# Patient Record
Sex: MALE | Race: BLACK OR AFRICAN AMERICAN | Employment: FULL TIME | URBAN - METROPOLITAN AREA
[De-identification: names, ages, dates, MRNs, and addresses within clinical notes are randomized per-mention and may not be internally consistent; named-entity substitution may affect disease eponyms.]

---

## 2018-08-29 ENCOUNTER — HOSPITAL ENCOUNTER (OUTPATIENT)
Dept: PHYSICAL THERAPY | Age: 58
Discharge: HOME | End: 2018-08-29
Payer: COMMERCIAL

## 2018-08-29 PROCEDURE — 97110 THERAPEUTIC EXERCISES: CPT

## 2018-08-29 PROCEDURE — 97161 PT EVAL LOW COMPLEX 20 MIN: CPT

## 2018-08-29 NOTE — PROGRESS NOTES
Ambulatory/Rehab Services H2 Model Falls Risk Assessment    Risk Factor Pts. ·   Confusion/Disorientation/Impulsivity  []    4 ·   Symptomatic Depression  []   2 ·   Altered Elimination  []   1 ·   Dizziness/Vertigo  []   1 ·   Gender (Male)  [x]   1 ·   Any administered antiepileptics (anticonvulsants):  []   2 ·   Any administered benzodiazepines:  []   1 ·   Visual Impairment (specify):  []   1 ·   Portable Oxygen Use  []   1 ·   Orthostatic ? BP  []   1 ·   History of Recent Falls (within 3 mos.)  []   5     Ability to Rise from Chair (choose one) Pts. ·   Ability to rise in a single movement  []   0 ·   Pushes up, successful in one attempt  []   1 ·   Multiple attempts, but successful  []   3 ·   Unable to rise without assistance  []   4   Total: (5 or greater = High Risk) 1     Falls Prevention Plan:   []                Physical Limitations to Exercise (specify):   []                Mobility Assistance Device (type):   []                Exercise/Equipment Adaptation (specify):    ©2010 [facility] [address] [place] [address] Patent #[id number].  Federal Law prohibits the replication, distribution or use without written permission from [facility] [employer]

## 2018-08-29 NOTE — THERAPY EVALUATION
Ni Boogie  : 1960  Payor: Brayan العراقي / Plan: [facility] / Product Type: PPO /  [facility] at [facility]  [address], [address], [address]  [phone]   [phone]        OUTPATIENT PHYSICAL THERAPY:Initial Assessment 2018   ICD-10: Treatment Diagnosis: Pain in right leg (M79.604), Pain in right hip (M25.551)  Precautions/Allergies:   Review of patient's allergies indicates no known allergies. Fall Risk Score: 1 (? 5 = High Risk)  MD Orders: evaluate and treat MEDICAL/REFERRING DIAGNOSIS:  Strain of adductor muscle, fascia and tendon of right thigh, initial encounter [P80.312K]   DATE OF ONSET: 1 month ago  REFERRING PHYSICIAN: Yanira Galvan MD  RETURN PHYSICIAN APPOINTMENT: --     INITIAL ASSESSMENT:  Mr. Bhavana Knapp presents with pain in musculature of R hip, specifically the iliopsoas and adductor john, and decreased flexibility and strength in the R hip. Pt will benefit from skilled physical therapy to address dysfunctions and pain. PROBLEM LIST (Impacting functional limitations):  1. Decreased Strength  2. Decreased ADL/Functional Activities  3. Increased Pain  4. Decreased [place] with Home Exercise Program INTERVENTIONS PLANNED:  1. Home Exercise Program (HEP)  2. Manual Therapy including joint and soft tissue manipulation and mobilization, and dry needling  3. Therapeutic Exercise/Strengthening  4. Modalities including heat/cold application and electrical stimulation   TREATMENT PLAN:  Effective Dates: 18 TO 10/29/18. Frequency/Duration: 1-2 times a week for 4 weeks  GOALS: (Goals have been discussed and agreed upon with patient.)  Short-Term Functional Goals: Time Frame: 4 weeks  1. Pt will be independent with > or = 2 exercises in HEP. 2. Pt will report > or = 31 on LEFS. Discharge Goals: Time Frame: 8 weeks  1. Pt will be independent with > or = 4 exercises in HEP.    2. Pt will report > or = 49 on LEFS. 3. Pt will demonstrate functional R hip strength and mobility. Rehabilitation Potential For Stated Goals: Excellent  Regarding [address] [place] [address], I certify that the treatment plan above will be carried out by a therapist or under their direction. Thank you for this referral,  Serenity Denton, PT, DPT                   The information in this section was collected on 8/29/18 (except where otherwise noted). HISTORY:   History of Present Injury/Illness (Reason for Referral):  Has a one year history of groin/leg/hip pain that started to get a lot worse about 1 month ago. Went to  3 weeks ago. Had an inguinal hernia sx in 2004, the doctor said the hernia repair looks good and this is a separate issue. Makes pain worse: lifting, twisting, pushing as pedal on forklift. Makes better: ice  Past Medical History/Comorbidities:   Mr. Payton Dandy  has a past medical history of H/O appendicitis; H/O hernia repair; History of cellulitis; Hyperlipidemia; Prediabetes; and Visit for dental examination (03/22/2017). He also has no past medical history of Aneurysm ([address] Arrhythmia; Arthritis; Asthma; Autoimmune disease ([address] CAD (coronary artery disease); Cancer ([address] Chronic kidney disease; Chronic pain; Coagulation defects; COPD; Diabetes ([address] GERD (gastroesophageal reflux disease); Heart failure ([address] Hypertension; Liver disease; Other ill-defined conditions(799.89); Psychiatric disorder; PUD (peptic ulcer disease); Seizures ([address] Stroke [facility]); Thromboembolus ([address] or Thyroid disease. Mr. Payton Dandy  has a past surgical history that includes hx appendectomy (1984); hx hernia repair (2004); and hx colonoscopy.   Social History/Living Environment:     house  Prior Level of Function/Work/Activity:  Working at [employer], lifting boxes  Dominant Side:         RIGHT  Current Medications:       Current Outpatient Prescriptions:     naproxen (NAPROSYN) 500 mg tablet, TAKE 1 TABLET BY MOUTH TWICE A DAY WITH MEALS, Disp: 60 Tab, Rfl: 1    traMADol (ULTRAM) 50 mg tablet, Take 1 Tab by mouth every six (6) hours as needed for Pain. Max Daily Amount: 200 mg., Disp: 30 Tab, Rfl: 0    ezetimibe (ZETIA) 10 mg tablet, Take 1 Tab by mouth daily. , Disp: 90 Tab, Rfl: 3    simvastatin (ZOCOR) 40 mg tablet, TAKE 1 TAB BY MOUTH DAILY. , Disp: 90 Tab, Rfl: 2    multivitamin (ONE A DAY) tablet, Take 1 Tab by mouth daily. , Disp: , Rfl:    Date Last Reviewed:  8/29/2018   Number of Personal Factors/Comorbidities that affect the Plan of Care: 0: LOW COMPLEXITY   EXAMINATION:   Observation/Orthostatic Postural Assessment:          Standing: no abnormalities noted        Ambulation: no abnormalities noted    Palpation:          Increased tone and tenderness to palpation R iliacus mm belly, iliopsoas         Mild tenderness in adductor john  ROM/Strength  Joint/Movement  Range of Motion- eval ROM - reassess  Date:  Strength- eval Strength - reassess  Date:   Hip flexion R WFL  3+/5    Hip extension R stretch felt going past neutral  4/5    Hip abduction   4/5              Balance:          No functional deficits noted  SFMA Top Tier (FN = Functional and Non-painful, FP = Functional and Painful, DP = Dysfunctional and Painful, DN = Dysfunctional and Non-painful)  Cervical flexion: fn  Cervical extension: fn   Cervical rotation: L fn, R fn  Upper Extremity Pattern 1 (extension, IR): L dn, R dn  Upper Extremity Pattern 2 (flexion, ER): L dn, R dn  Multi-segmental flexion: fn  Multi-segmental extension: dn  Multi-segmental rotation: L dn, R dn  Single-Leg Stance: L dn, R dn  Overhead Deep Squat: --   Summary: no pain with screen, but limited into extension and rotations     Body Structures Involved:  1. Joints  2. Muscles Body Functions Affected:  1. Sensory/Pain  2. Neuromusculoskeletal  3. Movement Related Activities and Participation Affected:  1. General Tasks and Demands  2. Self Care  3. Domestic Life  4.  Community, Social and [place]   Number of elements (examined above) that affect the Plan of Care: 1-2: LOW COMPLEXITY   CLINICAL PRESENTATION:   Presentation: Stable and uncomplicated: LOW COMPLEXITY   CLINICAL DECISION MAKING:   Outcome Measure: Tool Used: Lower Extremity Functional Scale (LEFS)  Score:  Initial: 22/80 Most Recent: X/80 (Date: -- )   Interpretation of Score: 20 questions each scored on a 5 point scale with 0 representing \"extreme difficulty or unable to perform\" and 4 representing \"no difficulty\". The lower the score, the greater the functional disability. 80/80 represents no disability. Minimal detectable change is 9 points. Score 80 79-63 62-48 47-32 31-16 15-1 0   Modifier CH CI CJ CK CL CM CN     Medical Necessity:   · Skilled intervention continues to be required due to decreased function. Reason for Services/Other Comments:  · Patient continues to require skilled intervention due to decreased function. Use of outcome tool(s) and clinical judgement create a POC that gives a: Clear prediction of patient's progress: LOW COMPLEXITY            TREATMENT:   (In addition to Assessment/Re-Assessment sessions the following treatments were rendered)  Pre-treatment Symptoms/Complaints:  Pt has high pain levels with movement  Pain: Initial:      Post Session:       THERAPEUTIC EXERCISE: (10 minutes):  Exercises per grid below to improve mobility and strength. Date:  8/29 Date:   Date:     Activity/Exercise Parameters Parameters Parameters         SLR X 10     Supine hip flexor stretch 30 sec hold x 3     1/2 kneeling hip flexor stretch 10 sec hold x 3                           MANUAL THERAPY: ( minutes): To increase motion and reduce pain    MODALITIES: ( minutes):       Treatment/Session Assessment:    · Response to Treatment:  Pt demonstrated understanding of exercises. Increased fatigue with SLR. · Compliance with Program/Exercises: Will assess as treatment progresses.   · Recommendations/Intent for next treatment session: \"Next visit will focus on advancements to more challenging activities\".   Total Treatment Duration:  PT Patient Time In/Time Out  Time In: 1615  Time Out: 1700    Future Appointments  Date Time Provider Jamey Gonzalez   9/5/2018 4:15 PM Lianna Rosa PT, DPT [facility]   9/7/2018 10:10 AM PST [facility]   9/10/2018 5:15 PM Geovani Laboy PT, DPT [facility]   9/19/2018 5:15 PM Geovani Laboy PT DPT [facility]   9/28/2018 8:40 AM Dustin Smith MD [facility]   9/28/2018 10:45 AM Lianna Rosa PT, DPT [facility]       Lianna Rosa PT, DPT

## 2018-09-05 ENCOUNTER — HOSPITAL ENCOUNTER (OUTPATIENT)
Dept: PHYSICAL THERAPY | Age: 58
Discharge: HOME | End: 2018-09-05
Payer: COMMERCIAL

## 2018-09-05 PROCEDURE — 97140 MANUAL THERAPY 1/> REGIONS: CPT

## 2018-09-05 PROCEDURE — 97110 THERAPEUTIC EXERCISES: CPT

## 2018-09-05 NOTE — THERAPY EVALUATION
Ni Boogie : 1960 Payor: Brayan العراقي / Plan: [facility] / Product Type: PPO /  [id number]  at [facility] [address], [address], [place] [phone]   [phone] OUTPATIENT PHYSICAL THERAPY:Initial Assessment 2018 ICD-10: Treatment Diagnosis: Pain in right leg (M79.604), Pain in right hip (M25.551) Precautions/Allergies:  
Review of patient's allergies indicates no known allergies. Fall Risk Score: 1 (? 5 = High Risk) MD Orders: evaluate and treat MEDICAL/REFERRING DIAGNOSIS: 
Strain of adductor muscle, fascia and tendon of right thigh, initial encounter [A88.720P] DATE OF ONSET: 1 month ago REFERRING PHYSICIAN: Yanira Galvan MD 
RETURN PHYSICIAN APPOINTMENT: --  
 
INITIAL ASSESSMENT:  Mr. Bhavana Knapp presents with pain in musculature of R hip, specifically the iliopsoas and adductor john, and decreased flexibility and strength in the R hip. Pt will benefit from skilled physical therapy to address dysfunctions and pain. PROBLEM LIST (Impacting functional limitations): 1. Decreased Strength 2. Decreased ADL/Functional Activities 3. Increased Pain 4. Decreased [place] with Home Exercise Program INTERVENTIONS PLANNED: 
1. Home Exercise Program (HEP) 2. Manual Therapy including joint and soft tissue manipulation and mobilization, and dry needling 3. Therapeutic Exercise/Strengthening 4. Modalities including heat/cold application and electrical stimulation TREATMENT PLAN: 
Effective Dates: 18 TO 10/29/18. Frequency/Duration: 1-2 times a week for 4 weeks GOALS: (Goals have been discussed and agreed upon with patient.) Short-Term Functional Goals: Time Frame: 4 weeks 1. Pt will be independent with > or = 2 exercises in HEP. 2. Pt will report > or = 31 on LEFS. Discharge Goals: Time Frame: 8 weeks 1. Pt will be independent with > or = 4 exercises in HEP. 2. Pt will report > or = 49 on LEFS. 3. Pt will demonstrate functional R hip strength and mobility. Rehabilitation Potential For Stated Goals: Excellent Regarding Mar Flor's therapy, I certify that the treatment plan above will be carried out by a therapist or under their direction. Thank you for this referral, Marin Sterling, PT, DPT The information in this section was collected on 8/29/18 (except where otherwise noted). HISTORY:  
History of Present Injury/Illness (Reason for Referral): 
Has a one year history of groin/leg/hip pain that started to get a lot worse about 1 month ago. Went to  3 weeks ago. Had an inguinal hernia sx in 2004, the doctor said the hernia repair looks good and this is a separate issue. Makes pain worse: lifting, twisting, pushing as pedal on forklift. Makes better: ice Past Medical History/Comorbidities:  
Mr. Nina Slater  has a past medical history of H/O appendicitis; H/O hernia repair; History of cellulitis; Hyperlipidemia; Prediabetes; and Visit for dental examination (03/22/2017). He also has no past medical history of Aneurysm ([address] Arrhythmia; Arthritis; Asthma; Autoimmune disease ([address] CAD (coronary artery disease); Cancer ([address] Chronic kidney disease; Chronic pain; Coagulation defects; COPD; Diabetes ([address] GERD (gastroesophageal reflux disease); Heart failure ([address] Hypertension; Liver disease; Other ill-defined conditions(799.89); Psychiatric disorder; PUD (peptic ulcer disease); Seizures ([address] Stroke [facility]); Thromboembolus ([address] or Thyroid disease. Mr. Nina Slater  has a past surgical history that includes hx appendectomy (1984); hx hernia repair (2004); and hx colonoscopy. Social History/Living Environment:  
  house Prior Level of Function/Work/Activity: 
Working at [employer] Dominant Side:  
      RIGHT Current Medications:   
  
Current Outpatient Prescriptions:   naproxen (NAPROSYN) 500 mg tablet, TAKE 1 TABLET BY MOUTH TWICE A DAY WITH MEALS, Disp: 60 Tab, Rfl: 1 
  traMADol (ULTRAM) 50 mg tablet, Take 1 Tab by mouth every six (6) hours as needed for Pain. Max Daily Amount: 200 mg., Disp: 30 Tab, Rfl: 0 
  ezetimibe (ZETIA) 10 mg tablet, Take 1 Tab by mouth daily. , Disp: 90 Tab, Rfl: 3 
  simvastatin (ZOCOR) 40 mg tablet, TAKE 1 TAB BY MOUTH DAILY. , Disp: 90 Tab, Rfl: 2 
  multivitamin (ONE A DAY) tablet, Take 1 Tab by mouth daily. , Disp: , Rfl:   
Date Last Reviewed:  9/5/2018 Number of Personal Factors/Comorbidities that affect the Plan of Care: 0: LOW COMPLEXITY EXAMINATION:  
Observation/Orthostatic Postural Assessment:   
      Standing: no abnormalities noted Ambulation: no abnormalities noted Palpation:   
      Increased tone and tenderness to palpation R iliacus mm belly, iliopsoas Mild tenderness in adductor john ROM/Strength Joint/Movement  Range of Motion- eval ROM - reassess Date:  Strength- eval Strength - reassess Date:  
Hip flexion R WFL  3+/5 Hip extension R stretch felt going past neutral  4/5 Hip abduction   4/5 Balance: No functional deficits noted SFMA Top Tier (FN = Functional and Non-painful, FP = Functional and Painful, DP = Dysfunctional and Painful, DN = Dysfunctional and Non-painful) Cervical flexion: fn 
Cervical extension: fn  
Cervical rotation: L fn, R fn 
Upper Extremity Pattern 1 (extension, IR): L dn, R dn Upper Extremity Pattern 2 (flexion, ER): L dn, R dn 
Multi-segmental flexion: fn 
Multi-segmental extension: dn 
Multi-segmental rotation: L dn, R dn 
Single-Leg Stance: L dn, R dn 
Overhead Deep Squat: --  
Summary: no pain with screen, but limited into extension and rotations Body Structures Involved: 1. Joints 2. Muscles Body Functions Affected: 1. Sensory/Pain 2. Neuromusculoskeletal 
3. Movement Related Activities and Participation Affected: 1. General Tasks and Demands 2. Self Care 3. Domestic Life 4. Community, Social and [place] Number of elements (examined above) that affect the Plan of Care: 1-2: LOW COMPLEXITY CLINICAL PRESENTATION:  
Presentation: Stable and uncomplicated: LOW COMPLEXITY CLINICAL DECISION MAKING:  
Outcome Measure: Tool Used: Lower Extremity Functional Scale (LEFS) Score:  Initial: 22/80 Most Recent: X/80 (Date: -- ) Interpretation of Score: 20 questions each scored on a 5 point scale with 0 representing \"extreme difficulty or unable to perform\" and 4 representing \"no difficulty\". The lower the score, the greater the functional disability. 80/80 represents no disability. Minimal detectable change is 9 points. Score 80 79-63 62-48 47-32 31-16 15-1 0 Modifier CH CI CJ CK CL CM CN Medical Necessity:  
· Skilled intervention continues to be required due to decreased function. Reason for Services/Other Comments: 
· Patient continues to require skilled intervention due to decreased function. Use of outcome tool(s) and clinical judgement create a POC that gives a: Clear prediction of patient's progress: LOW COMPLEXITY  
  
 
 
 
TREATMENT:  
(In addition to Assessment/Re-Assessment sessions the following treatments were rendered) Pre-treatment Symptoms/Complaints:  Pt reports significant reduction in pain since IE. [place] issue. Pain: Initial:  
   Post Session:    
 
THERAPEUTIC EXERCISE: (25 minutes):  Exercises per grid below to improve mobility and strength. Date: 
8/29 Date: 
9/5 Date: Activity/Exercise Parameters Parameters Parameters Recumbent stepper  X 8 min level 1 SLR X 10 X 12 Supine hip flexor stretch 30 sec hold x 3 30 sec hold x 3, with isometric hold against therapist for contract-relax 1/2 kneeling hip flexor stretch 10 sec hold x 3 Bridge with march  X 10 Abdominal isometrics  10 sec hold x 5 ea   
marching  4 laps x 40 ft   
 sidestepping  4 laps x 40 ft Step up  6\" step x 20 ea MANUAL THERAPY: (15 minutes): To increase motion and reduce pain 
- hip flexor release 
- cross friction massage to distal iliopsoas MODALITIES: ( minutes): Treatment/Session Assessment:   
· Response to Treatment:  Pt reports that he can 'feel it' with most exercises. Focused on hip flexor strengthening and stretching activities. · Compliance with Program/Exercises: Will assess as treatment progresses. · Recommendations/Intent for next treatment session: \"Next visit will focus on advancements to more challenging activities\". Total Treatment Duration: PT Patient Time In/Time Out Time In: 7828 Time Out: 3116 Future Appointments Date Time Provider Jamey Gonzalez 9/7/2018 10:10 AM PST LAB [facility]  
9/10/2018 5:15 PM Wilfrid Granados PT, DPT [facility]  
9/19/2018 5:15 PM Wilfrid Granados PT, DPT [facility]  
9/28/2018 8:40 AM Kelly Hardwick MD [facility] PST PST  
9/28/2018 10:45 AM Luz Blas PT, DPT [facility] Luz Blas PT, DPT

## 2018-09-05 NOTE — PROGRESS NOTES
Noam Branch  : 1960  Payor: Leora Arredondo / Plan: [facility] / Product Type: PPO /  [address]  at [address]  [address], [address], [place]  [phone]   [phone]        OUTPATIENT PHYSICAL THERAPY:Daily Note 2018   ICD-10: Treatment Diagnosis: Pain in right leg (M79.604), Pain in right hip (M25.551)  Precautions/Allergies:   Review of patient's allergies indicates no known allergies. Fall Risk Score: 1 (? 5 = High Risk)  MD Orders: evaluate and treat MEDICAL/REFERRING DIAGNOSIS:  Strain of adductor muscle, fascia and tendon of right thigh, initial encounter [K68.395X]   DATE OF ONSET: 1 month ago  REFERRING PHYSICIAN: Wilda Montoya MD  RETURN PHYSICIAN APPOINTMENT: --     INITIAL ASSESSMENT:  Mr. Joana Diaz presents with pain in musculature of R hip, specifically the iliopsoas and adductor john, and decreased flexibility and strength in the R hip. Pt will benefit from skilled physical therapy to address dysfunctions and pain. PROBLEM LIST (Impacting functional limitations):  1. Decreased Strength  2. Decreased ADL/Functional Activities  3. Increased Pain  4. Decreased [place] with Home Exercise Program INTERVENTIONS PLANNED:  1. Home Exercise Program (HEP)  2. Manual Therapy including joint and soft tissue manipulation and mobilization, and dry needling  3. Therapeutic Exercise/Strengthening  4. Modalities including heat/cold application and electrical stimulation   TREATMENT PLAN:  Effective Dates: 18 TO 10/29/18. Frequency/Duration: 1-2 times a week for 4 weeks  GOALS: (Goals have been discussed and agreed upon with patient.)  Short-Term Functional Goals: Time Frame: 4 weeks  1. Pt will be independent with > or = 2 exercises in HEP. 2. Pt will report > or = 31 on LEFS. Discharge Goals: Time Frame: 8 weeks  1. Pt will be independent with > or = 4 exercises in HEP.    2. Pt will report > or = 49 on LEFS.   3. Pt will demonstrate functional R hip strength and mobility. Rehabilitation Potential For Stated Goals: Excellent  Regarding [address] [place] [address], I certify that the treatment plan above will be carried out by a therapist or under their direction. Thank you for this referral,  Luz Blas, PT, DPT                   The information in this section was collected on 8/29/18 (except where otherwise noted). HISTORY:   History of Present Injury/Illness (Reason for Referral):  Has a one year history of groin/leg/hip pain that started to get a lot worse about 1 month ago. Went to  3 weeks ago. Had an inguinal hernia sx in 2004, the doctor said the hernia repair looks good and this is a separate issue. Makes pain worse: lifting, twisting, pushing as pedal on forklift. Makes better: ice  Past Medical History/Comorbidities:   Mr. Rosa Gruber  has a past medical history of H/O appendicitis; H/O hernia repair; History of cellulitis; Hyperlipidemia; Prediabetes; and Visit for dental examination (03/22/2017). He also has no past medical history of Aneurysm ([address] Arrhythmia; Arthritis; Asthma; Autoimmune disease ([address] CAD (coronary artery disease); Cancer ([address] Chronic kidney disease; Chronic pain; Coagulation defects; COPD; Diabetes ([address] GERD (gastroesophageal reflux disease); Heart failure ([address] Hypertension; Liver disease; Other ill-defined conditions(799.89); Psychiatric disorder; PUD (peptic ulcer disease); Seizures ([address] Stroke [facility]); Thromboembolus ([address] or Thyroid disease. Mr. Rosa Gruber  has a past surgical history that includes hx appendectomy (1984); hx hernia repair (2004); and hx colonoscopy.   Social History/Living Environment:     house  Prior Level of Function/Work/Activity:  Working at [employer], lifting boxes  Dominant Side:         RIGHT  Current Medications:       Current Outpatient Prescriptions:     naproxen (NAPROSYN) 500 mg tablet, TAKE 1 TABLET BY MOUTH TWICE A DAY WITH MEALS, Disp: 60 Tab, Rfl: 1    traMADol (ULTRAM) 50 mg tablet, Take 1 Tab by mouth every six (6) hours as needed for Pain. Max Daily Amount: 200 mg., Disp: 30 Tab, Rfl: 0    ezetimibe (ZETIA) 10 mg tablet, Take 1 Tab by mouth daily. , Disp: 90 Tab, Rfl: 3    simvastatin (ZOCOR) 40 mg tablet, TAKE 1 TAB BY MOUTH DAILY. , Disp: 90 Tab, Rfl: 2    multivitamin (ONE A DAY) tablet, Take 1 Tab by mouth daily. , Disp: , Rfl:    Date Last Reviewed:  9/5/2018   Number of Personal Factors/Comorbidities that affect the Plan of Care: 0: LOW COMPLEXITY   EXAMINATION:   Observation/Orthostatic Postural Assessment:          Standing: no abnormalities noted        Ambulation: no abnormalities noted    Palpation:          Increased tone and tenderness to palpation R iliacus mm belly, iliopsoas         Mild tenderness in adductor john  ROM/Strength  Joint/Movement  Range of Motion- eval ROM - reassess  Date:  Strength- eval Strength - reassess  Date:   Hip flexion R WFL  3+/5    Hip extension R stretch felt going past neutral  4/5    Hip abduction   4/5              Balance:          No functional deficits noted  SFMA Top Tier (FN = Functional and Non-painful, FP = Functional and Painful, DP = Dysfunctional and Painful, DN = Dysfunctional and Non-painful)  Cervical flexion: fn  Cervical extension: fn   Cervical rotation: L fn, R fn  Upper Extremity Pattern 1 (extension, IR): L dn, R dn  Upper Extremity Pattern 2 (flexion, ER): L dn, R dn  Multi-segmental flexion: fn  Multi-segmental extension: dn  Multi-segmental rotation: L dn, R dn  Single-Leg Stance: L dn, R dn  Overhead Deep Squat: --   Summary: no pain with screen, but limited into extension and rotations     Body Structures Involved:  1. Joints  2. Muscles Body Functions Affected:  1. Sensory/Pain  2. Neuromusculoskeletal  3. Movement Related Activities and Participation Affected:  1. General Tasks and Demands  2. Self Care  3. Domestic Life  4.  Community, Social and Civic Life   Number of elements (examined above) that affect the Plan of Care: 1-2: LOW COMPLEXITY   CLINICAL PRESENTATION:   Presentation: Stable and uncomplicated: LOW COMPLEXITY   CLINICAL DECISION MAKING:   Outcome Measure: Tool Used: Lower Extremity Functional Scale (LEFS)  Score:  Initial: 22/80 Most Recent: X/80 (Date: -- )   Interpretation of Score: 20 questions each scored on a 5 point scale with 0 representing \"extreme difficulty or unable to perform\" and 4 representing \"no difficulty\". The lower the score, the greater the functional disability. 80/80 represents no disability. Minimal detectable change is 9 points. Score 80 79-63 62-48 47-32 31-16 15-1 0   Modifier CH CI CJ CK CL CM CN     Medical Necessity:   · Skilled intervention continues to be required due to decreased function. Reason for Services/Other Comments:  · Patient continues to require skilled intervention due to decreased function. Use of outcome tool(s) and clinical judgement create a POC that gives a: Clear prediction of patient's progress: LOW COMPLEXITY            TREATMENT:   (In addition to Assessment/Re-Assessment sessions the following treatments were rendered)  Pre-treatment Symptoms/Complaints:  Pt reports significant reduction in pain since IE. [place] issue. Pain: Initial:      Post Session:       THERAPEUTIC EXERCISE: (25 minutes):  Exercises per grid below to improve mobility and strength.     Date:  8/29 Date:  9/5 Date:     Activity/Exercise Parameters Parameters Parameters   Recumbent stepper  X 8 min level 1    SLR X 10 X 12    Supine hip flexor stretch 30 sec hold x 3 30 sec hold x 3, with isometric hold against therapist for contract-relax    1/2 kneeling hip flexor stretch 10 sec hold x 3     Bridge with march  X 10    Abdominal isometrics  10 sec hold x 5 ea    marching  4 laps x 40 ft    sidestepping  4 laps x 40 ft    Step up  6\" step x 20 ea MANUAL THERAPY: (15 minutes): To increase motion and reduce pain  - hip flexor release  - cross friction massage to distal iliopsoas    MODALITIES: ( minutes):       Treatment/Session Assessment:    · Response to Treatment:  Pt reports that he can 'feel it' with most exercises. Focused on hip flexor strengthening and stretching activities. · Compliance with Program/Exercises: Will assess as treatment progresses. · Recommendations/Intent for next treatment session: \"Next visit will focus on advancements to more challenging activities\".   Total Treatment Duration:  PT Patient Time In/Time Out  Time In: 1615  Time Out: 1655    Future Appointments  Date Time Provider Jamey Gonzalez   9/7/2018 10:10 AM PST LAB [facility]   9/10/2018 5:15 PM Mason Santos PT, DPT [facility]   9/19/2018 5:15 PM Mason Santos PT, DPSHAHRIAR [facility]   9/28/2018 8:40 AM Joanne Torrez MD [facility] PST PST   9/28/2018 10:45 AM Manual Delta PT, DPT [facility]       Manual Delat PT, DPT

## 2018-09-10 ENCOUNTER — HOSPITAL ENCOUNTER (OUTPATIENT)
Dept: PHYSICAL THERAPY | Age: 58
Discharge: HOME | End: 2018-09-10
Payer: COMMERCIAL

## 2018-09-10 PROCEDURE — 97110 THERAPEUTIC EXERCISES: CPT

## 2018-09-10 PROCEDURE — 97140 MANUAL THERAPY 1/> REGIONS: CPT

## 2018-09-10 NOTE — PROGRESS NOTES
Kade Llanos  : 1960  Payor: Claudeen Lao / Plan: [facility] / Product Type: PPO /  [facility]rapy Center at [address]  [address], [address], [address]  [phone]   [phone]        OUTPATIENT PHYSICAL THERAPY:Daily Note 9/10/2018   ICD-10: Treatment Diagnosis: Pain in right leg (M79.604), Pain in right hip (M25.551)  Precautions/Allergies:   Review of patient's allergies indicates no known allergies. Fall Risk Score: 1 (? 5 = High Risk)  MD Orders: evaluate and treat MEDICAL/REFERRING DIAGNOSIS:  Strain of adductor muscle, fascia and tendon of right thigh, initial encounter [Q32.336V]   DATE OF ONSET: 1 month ago  REFERRING PHYSICIAN: Mihai Mayberry MD  RETURN PHYSICIAN APPOINTMENT: --     INITIAL ASSESSMENT:  Mr. Faith Beht presents with pain in musculature of R hip, specifically the iliopsoas and adductor john, and decreased flexibility and strength in the R hip. Pt will benefit from skilled physical therapy to address dysfunctions and pain. PROBLEM LIST (Impacting functional limitations):  1. Decreased Strength  2. Decreased ADL/Functional Activities  3. Increased Pain  4. Decreased [place] with Home Exercise Program INTERVENTIONS PLANNED:  1. Home Exercise Program (HEP)  2. Manual Therapy including joint and soft tissue manipulation and mobilization, and dry needling  3. Therapeutic Exercise/Strengthening  4. Modalities including heat/cold application and electrical stimulation   TREATMENT PLAN:  Effective Dates: 18 TO 10/29/18. Frequency/Duration: 1-2 times a week for 4 weeks  GOALS: (Goals have been discussed and agreed upon with patient.)  Short-Term Functional Goals: Time Frame: 4 weeks  1. Pt will be independent with > or = 2 exercises in HEP. 2. Pt will report > or = 31 on LEFS. Discharge Goals: Time Frame: 8 weeks  1. Pt will be independent with > or = 4 exercises in HEP.    2. Pt will report > or = 49 on LEFS. 3. Pt will demonstrate functional R hip strength and mobility. Rehabilitation Potential For Stated Goals: Excellent  Regarding [address] [place] [address], I certify that the treatment plan above will be carried out by a therapist or under their direction. Thank you for this referral,  Devin Young, PT, DPT                   The information in this section was collected on 8/29/18 (except where otherwise noted). HISTORY:   History of Present Injury/Illness (Reason for Referral):  Has a one year history of groin/leg/hip pain that started to get a lot worse about 1 month ago. Went to  3 weeks ago. Had an inguinal hernia sx in 2004, the doctor said the hernia repair looks good and this is a separate issue. Makes pain worse: lifting, twisting, pushing as pedal on forklift. Makes better: ice  Past Medical History/Comorbidities:   Mr. Faith Beth  has a past medical history of H/O appendicitis; H/O hernia repair; History of cellulitis; Hyperlipidemia; Prediabetes; and Visit for dental examination (03/22/2017). He also has no past medical history of Aneurysm ([address] Arrhythmia; Arthritis; Asthma; Autoimmune disease ([address] CAD (coronary artery disease); Cancer ([address] Chronic kidney disease; Chronic pain; Coagulation defects; COPD; Diabetes ([address] GERD (gastroesophageal reflux disease); Heart failure ([address] Hypertension; Liver disease; Other ill-defined conditions(799.89); Psychiatric disorder; PUD (peptic ulcer disease); Seizures ([address] Stroke [facility]); Thromboembolus ([address] or Thyroid disease. Mr. Faith Beth  has a past surgical history that includes hx appendectomy (1984); hx hernia repair (2004); and hx colonoscopy.   Social History/Living Environment:     house  Prior Level of Function/Work/Activity:  Working at [employer], lifting boxes  Dominant Side:         RIGHT  Current Medications:       Current Outpatient Prescriptions:     naproxen (NAPROSYN) 500 mg tablet, TAKE 1 TABLET BY MOUTH TWICE A DAY WITH MEALS, Disp: 60 Tab, Rfl: 1    traMADol (ULTRAM) 50 mg tablet, Take 1 Tab by mouth every six (6) hours as needed for Pain. Max Daily Amount: 200 mg., Disp: 30 Tab, Rfl: 0    ezetimibe (ZETIA) 10 mg tablet, Take 1 Tab by mouth daily. , Disp: 90 Tab, Rfl: 3    simvastatin (ZOCOR) 40 mg tablet, TAKE 1 TAB BY MOUTH DAILY. , Disp: 90 Tab, Rfl: 2    multivitamin (ONE A DAY) tablet, Take 1 Tab by mouth daily. , Disp: , Rfl:    Date Last Reviewed:  9/10/2018   Number of Personal Factors/Comorbidities that affect the Plan of Care: 0: LOW COMPLEXITY   EXAMINATION:   Observation/Orthostatic Postural Assessment:          Standing: no abnormalities noted        Ambulation: no abnormalities noted    Palpation:          Increased tone and tenderness to palpation R iliacus mm belly, iliopsoas         Mild tenderness in adductor john  ROM/Strength  Joint/Movement  Range of Motion- eval ROM - reassess  Date:  Strength- eval Strength - reassess  Date:   Hip flexion R WFL  3+/5    Hip extension R stretch felt going past neutral  4/5    Hip abduction   4/5              Balance:          No functional deficits noted  SFMA Top Tier (FN = Functional and Non-painful, FP = Functional and Painful, DP = Dysfunctional and Painful, DN = Dysfunctional and Non-painful)  Cervical flexion: fn  Cervical extension: fn   Cervical rotation: L fn, R fn  Upper Extremity Pattern 1 (extension, IR): L dn, R dn  Upper Extremity Pattern 2 (flexion, ER): L dn, R dn  Multi-segmental flexion: fn  Multi-segmental extension: dn  Multi-segmental rotation: L dn, R dn  Single-Leg Stance: L dn, R dn  Overhead Deep Squat: --   Summary: no pain with screen, but limited into extension and rotations     Body Structures Involved:  1. Joints  2. Muscles Body Functions Affected:  1. Sensory/Pain  2. Neuromusculoskeletal  3. Movement Related Activities and Participation Affected:  1. General Tasks and Demands  2. Self Care  3. Domestic Life  4.  Community, Social and [place]   Number of elements (examined above) that affect the Plan of Care: 1-2: LOW COMPLEXITY   CLINICAL PRESENTATION:   Presentation: Stable and uncomplicated: LOW COMPLEXITY   CLINICAL DECISION MAKING:   Outcome Measure: Tool Used: Lower Extremity Functional Scale (LEFS)  Score:  Initial: 22/80 Most Recent: X/80 (Date: -- )   Interpretation of Score: 20 questions each scored on a 5 point scale with 0 representing \"extreme difficulty or unable to perform\" and 4 representing \"no difficulty\". The lower the score, the greater the functional disability. 80/80 represents no disability. Minimal detectable change is 9 points. Score 80 79-63 62-48 47-32 31-16 15-1 0   Modifier CH CI CJ CK CL CM CN     Medical Necessity:   · Skilled intervention continues to be required due to decreased function. Reason for Services/Other Comments:  · Patient continues to require skilled intervention due to decreased function. Use of outcome tool(s) and clinical judgement create a POC that gives a: Clear prediction of patient's progress: LOW COMPLEXITY            TREATMENT:   (In addition to Assessment/Re-Assessment sessions the following treatments were rendered)  Pre-treatment Symptoms/Complaints:  Pt reports continuing improvement, down from the 9/10 pain before therapy started. Pain: Initial: 5/10     Post Session:       THERAPEUTIC EXERCISE: (30 minutes):  Exercises per grid below to improve mobility and strength.     Date:  8/29 Date:  9/5 Date:  9/10   Activity/Exercise Parameters Parameters Parameters   Recumbent stepper  X 8 min level 1    Recumbent bike   X 8 min level 3   SLR X 10 X 12 X 15   Supine hip flexor stretch 30 sec hold x 3 30 sec hold x 3, with isometric hold against therapist for contract-relax 30 sec hold x 3, with isometric hold against therapist for contract-relax   1/2 kneeling hip flexor stretch 10 sec hold x 3     Bridge with march  X 10 2 x 10   Abdominal isometrics  10 sec hold x 5 ea 10 sec hold x 5 ea   marching  4 laps x 40 ft 4 laps x 40 ft   sidestepping  4 laps x 40 ft 4 laps x 40 ft   Step up  6\" step x 20 ea 6\" step x 20 ea forwards and lateral                         MANUAL THERAPY: (15 minutes): To increase motion and reduce pain  - cross friction massage to distal iliopsoas    MODALITIES: ( minutes):       Treatment/Session Assessment:    · Response to Treatment:  Pt reports minimal increased pain with exercises, but able to tolerate more repetitions. · Compliance with Program/Exercises: Will assess as treatment progresses. · Recommendations/Intent for next treatment session: \"Next visit will focus on advancements to more challenging activities\".   Total Treatment Duration:  PT Patient Time In/Time Out  Time In: 3509  Time Out: 1700    Future Appointments  Date Time Provider Jamey Gonzalez   9/19/2018 5:15 PM Tianna Avilez, PT, DPT [facility]   9/28/2018 10:10 AM PST LAB [facility] PST PST   9/28/2018 10:45 AM Angela Coello, PT, DPT [facility]       Angela Coello, PT, DPT

## 2018-09-19 ENCOUNTER — HOSPITAL ENCOUNTER (OUTPATIENT)
Dept: PHYSICAL THERAPY | Age: 58
Discharge: HOME | End: 2018-09-19
Payer: COMMERCIAL

## 2018-09-19 PROCEDURE — 97140 MANUAL THERAPY 1/> REGIONS: CPT

## 2018-09-19 PROCEDURE — 97110 THERAPEUTIC EXERCISES: CPT

## 2018-09-19 NOTE — PROGRESS NOTES
Carol Mcnair  : 1960  Payor: Carrie Deluca / Plan: [place] [facility] / Product Type: PPO /  [facility] at [facility]  [address], [address], [address]  [phone]   [phone]        OUTPATIENT PHYSICAL THERAPY:Daily Note 2018   ICD-10: Treatment Diagnosis: Pain in right leg (M79.604), Pain in right hip (M25.551)  Precautions/Allergies:   Review of patient's allergies indicates no known allergies. Fall Risk Score: 1 (? 5 = High Risk)  MD Orders: evaluate and treat MEDICAL/REFERRING DIAGNOSIS:  Strain of adductor muscle, fascia and tendon of right thigh, initial encounter [C72.181Z]   DATE OF ONSET: 1 month ago  REFERRING PHYSICIAN: Elizabeth Glez MD  RETURN PHYSICIAN APPOINTMENT: --     INITIAL ASSESSMENT:  Mr. Sergio Olivares presents with pain in musculature of R hip, specifically the iliopsoas and adductor john, and decreased flexibility and strength in the R hip. Pt will benefit from skilled physical therapy to address dysfunctions and pain. PROBLEM LIST (Impacting functional limitations):  1. Decreased Strength  2. Decreased ADL/Functional Activities  3. Increased Pain  4. Decreased [place] with Home Exercise Program INTERVENTIONS PLANNED:  1. Home Exercise Program (HEP)  2. Manual Therapy including joint and soft tissue manipulation and mobilization, and dry needling  3. Therapeutic Exercise/Strengthening  4. Modalities including heat/cold application and electrical stimulation   TREATMENT PLAN:  Effective Dates: 18 TO 10/29/18. Frequency/Duration: 1-2 times a week for 4 weeks  GOALS: (Goals have been discussed and agreed upon with patient.)  Short-Term Functional Goals: Time Frame: 4 weeks  1. Pt will be independent with > or = 2 exercises in HEP. 2. Pt will report > or = 31 on LEFS. Discharge Goals: Time Frame: 8 weeks  1. Pt will be independent with > or = 4 exercises in HEP.    2. Pt will report > or = 49 on LEFS. 3. Pt will demonstrate functional R hip strength and mobility. Rehabilitation Potential For Stated Goals: Excellent  Regarding [address] [place] [address], I certify that the treatment plan above will be carried out by a therapist or under their direction. Thank you for this referral,  Ruben Ansari, PT, DPT                   The information in this section was collected on 8/29/18 (except where otherwise noted). HISTORY:   History of Present Injury/Illness (Reason for Referral):  Has a one year history of groin/leg/hip pain that started to get a lot worse about 1 month ago. Went to  3 weeks ago. Had an inguinal hernia sx in 2004, the doctor said the hernia repair looks good and this is a separate issue. Makes pain worse: lifting, twisting, pushing as pedal on forklift. Makes better: ice  Past Medical History/Comorbidities:   Mr. Ana Sultana  has a past medical history of H/O appendicitis; H/O hernia repair; History of cellulitis; Hyperlipidemia; Prediabetes; and Visit for dental examination (03/22/2017). He also has no past medical history of Aneurysm ([address] Arrhythmia; Arthritis; Asthma; Autoimmune disease ([address] CAD (coronary artery disease); Cancer ([address] Chronic kidney disease; Chronic pain; Coagulation defects; COPD; Diabetes ([address] GERD (gastroesophageal reflux disease); Heart failure ([address] Hypertension; Liver disease; Other ill-defined conditions(799.89); Psychiatric disorder; PUD (peptic ulcer disease); Seizures ([address] Stroke [facility]); Thromboembolus ([address] or Thyroid disease. Mr. Ana Sultana  has a past surgical history that includes hx appendectomy (1984); hx hernia repair (2004); and hx colonoscopy.   Social History/Living Environment:     house  Prior Level of Function/Work/Activity:  Working at [employer], lifting boxes  Dominant Side:         RIGHT  Current Medications:       Current Outpatient Prescriptions:     naproxen (NAPROSYN) 500 mg tablet, TAKE 1 TABLET BY MOUTH TWICE A DAY WITH MEALS, Disp: 60 Tab, Rfl: 1    traMADol (ULTRAM) 50 mg tablet, Take 1 Tab by mouth every six (6) hours as needed for Pain. Max Daily Amount: 200 mg., Disp: 30 Tab, Rfl: 0    ezetimibe (ZETIA) 10 mg tablet, Take 1 Tab by mouth daily. , Disp: 90 Tab, Rfl: 3    simvastatin (ZOCOR) 40 mg tablet, TAKE 1 TAB BY MOUTH DAILY. , Disp: 90 Tab, Rfl: 2    multivitamin (ONE A DAY) tablet, Take 1 Tab by mouth daily. , Disp: , Rfl:    Date Last Reviewed:  9/19/2018   Number of Personal Factors/Comorbidities that affect the Plan of Care: 0: LOW COMPLEXITY   EXAMINATION:   Observation/Orthostatic Postural Assessment:          Standing: no abnormalities noted        Ambulation: no abnormalities noted    Palpation:          Increased tone and tenderness to palpation R iliacus mm belly, iliopsoas         Mild tenderness in adductor john  ROM/Strength  Joint/Movement  Range of Motion- eval ROM - reassess  Date:  Strength- eval Strength - reassess  Date:   Hip flexion R WFL  3+/5    Hip extension R stretch felt going past neutral  4/5    Hip abduction   4/5              Balance:          No functional deficits noted  SFMA Top Tier (FN = Functional and Non-painful, FP = Functional and Painful, DP = Dysfunctional and Painful, DN = Dysfunctional and Non-painful)  Cervical flexion: fn  Cervical extension: fn   Cervical rotation: L fn, R fn  Upper Extremity Pattern 1 (extension, IR): L dn, R dn  Upper Extremity Pattern 2 (flexion, ER): L dn, R dn  Multi-segmental flexion: fn  Multi-segmental extension: dn  Multi-segmental rotation: L dn, R dn  Single-Leg Stance: L dn, R dn  Overhead Deep Squat: --   Summary: no pain with screen, but limited into extension and rotations     Body Structures Involved:  1. Joints  2. Muscles Body Functions Affected:  1. Sensory/Pain  2. Neuromusculoskeletal  3. Movement Related Activities and Participation Affected:  1. General Tasks and Demands  2. Self Care  3. Domestic Life  4.  Community, Social and [place]   Number of elements (examined above) that affect the Plan of Care: 1-2: LOW COMPLEXITY   CLINICAL PRESENTATION:   Presentation: Stable and uncomplicated: LOW COMPLEXITY   CLINICAL DECISION MAKING:   Outcome Measure: Tool Used: Lower Extremity Functional Scale (LEFS)  Score:  Initial: 22/80 Most Recent: X/80 (Date: -- )   Interpretation of Score: 20 questions each scored on a 5 point scale with 0 representing \"extreme difficulty or unable to perform\" and 4 representing \"no difficulty\". The lower the score, the greater the functional disability. 80/80 represents no disability. Minimal detectable change is 9 points. Score 80 79-63 62-48 47-32 31-16 15-1 0   Modifier CH CI CJ CK CL CM CN     Medical Necessity:   · Skilled intervention continues to be required due to decreased function. Reason for Services/Other Comments:  · Patient continues to require skilled intervention due to decreased function. Use of outcome tool(s) and clinical judgement create a POC that gives a: Clear prediction of patient's progress: LOW COMPLEXITY            TREATMENT:   (In addition to Assessment/Re-Assessment sessions the following treatments were rendered)  Pre-treatment Symptoms/Complaints:  Pt reports continuing improvement, walking up to 5 miles and cycling without significant symptoms. Pain: Initial: 5/10     Post Session:       THERAPEUTIC EXERCISE: (30 minutes):  Exercises per grid below to improve mobility and strength.     Date:  8/29 Date:  9/5 Date:  9/19   Activity/Exercise Parameters Parameters Parameters   Recumbent stepper  X 8 min level 1    Recumbent bike   X 8 min level 3   SLR X 10 X 12 X 15   Supine hip flexor stretch 30 sec hold x 3 30 sec hold x 3, with isometric hold against therapist for contract-relax 30 sec hold x 3, with isometric hold against therapist for contract-relax   1/2 kneeling hip flexor stretch 10 sec hold x 3     Bridge with march  X 10 2 x 10   Abdominal isometrics  10 sec hold x 5 ea 10 sec hold x 5 ea   marching  4 laps x 40 ft 4 laps x 40 ft   sidestepping  4 laps x 40 ft 4 laps x 40 ft   Step up  6\" step x 20 ea 6\" step x 20 ea forwards and lateral                         MANUAL THERAPY: (15 minutes): To increase motion and reduce pain  - cross friction massage to distal iliopsoas    MODALITIES: ( minutes):       Treatment/Session Assessment:    · Response to Treatment:  Pt reports minimal increased pain with exercises  · Compliance with Program/Exercises: Will assess as treatment progresses. · Recommendations/Intent for next treatment session: \"Next visit will focus on advancements to more challenging activities\".   Total Treatment Duration:  PT Patient Time In/Time Out  Time In: 1630  Time Out: 1715    Future Appointments  Date Time Provider Jamey Gonzalez   9/19/2018 5:15 PM Abena Styles, PT, DPT [facility]   9/28/2018 10:10 AM PST LAB [facility] PST PST   9/28/2018 10:45 AM [facility]       Abena Styles, PT, DPT

## 2018-09-28 ENCOUNTER — HOSPITAL ENCOUNTER (OUTPATIENT)
Dept: PHYSICAL THERAPY | Age: 58
Discharge: HOME | End: 2018-09-28
Payer: COMMERCIAL

## 2018-09-28 PROCEDURE — 97110 THERAPEUTIC EXERCISES: CPT

## 2018-09-28 NOTE — PROGRESS NOTES
Jenni Myers  : 1960  Payor: Leanne Mejias / Plan: [place] [facility] / Product Type: PPO /  [facility]rapy Center at [facility]  [address], [address], [address]  [phone]   [phone]        OUTPATIENT PHYSICAL THERAPY:Daily Note and Discharge 2018   ICD-10: Treatment Diagnosis: Pain in right leg (M79.604), Pain in right hip (M25.551)  Precautions/Allergies:   Review of patient's allergies indicates no known allergies. Fall Risk Score: 1 (? 5 = High Risk)  MD Orders: evaluate and treat MEDICAL/REFERRING DIAGNOSIS:  Strain of adductor muscle, fascia and tendon of right thigh, initial encounter [V96.297W]   DATE OF ONSET: 1 month ago  REFERRING PHYSICIAN: Rosalind Oliver MD  RETURN PHYSICIAN APPOINTMENT: --     INITIAL ASSESSMENT:  Mr. Collin Singh has attended 5 physical therapy treatments for R hip pain. He reports significant reduction in pain and improvement in function, he has returned to distance walking and bicycling without incident. He is independent with a home exercise program for hip stretching and strengthening. No further physical therapy is needed at this time. TREATMENT PLAN:  Discharge. GOALS: (Goals have been discussed and agreed upon with patient.)  Short-Term Functional Goals: Time Frame: 4 weeks  1. Pt will be independent with > or = 2 exercises in HEP. met  2. Pt will report > or = 31 on LEFS. met  Discharge Goals: Time Frame: 8 weeks  1. Pt will be independent with > or = 4 exercises in HEP. met  2. Pt will report > or = 49 on LEFS. met  3. Pt will demonstrate functional R hip strength and mobility. met    Thank you for this referral,  Breonna Baxter, PT, DPT                   The information in this section was collected on 18 (except where otherwise noted).   HISTORY:   History of Present Injury/Illness (Reason for Referral):  Has a one year history of groin/leg/hip pain that started to get a lot worse about 1 month ago. Went to  3 weeks ago. Had an inguinal hernia sx in 2004, the doctor said the hernia repair looks good and this is a separate issue. Makes pain worse: lifting, twisting, pushing as pedal on forklift. Makes better: ice  Past Medical History/Comorbidities:   Mr. Rachel Jacobs  has a past medical history of H/O appendicitis; H/O hernia repair; History of cellulitis; Hyperlipidemia; Prediabetes; and Visit for dental examination (03/22/2017). He also has no past medical history of [address]neurysm [address] Arrhythmia; Arthritis; Asthma; Autoimmune disease ([address] CAD (coronary artery disease); Cancer ([address] Chronic kidney disease; Chronic pain; Coagulation defects; COPD; Diabetes ([address] GERD (gastroesophageal reflux disease); Heart failure ([facility] Hypertension; Liver disease; Other ill-defined conditions(799.89); Psychiatric disorder; PUD (peptic ulcer disease); Seizures ([facility] Stroke [facility]); Thromboembolus ([facility] or Thyroid disease. Mr. Rachel Jacobs  has a past surgical history that includes hx appendectomy (1984); hx hernia repair (2004); and hx colonoscopy. Social History/Living Environment:     house  Prior Level of Function/Work/Activity:  Working at [employer], lifting boxes  Dominant Side:         RIGHT  Current Medications:       Current Outpatient Prescriptions:     naproxen (NAPROSYN) 500 mg tablet, TAKE 1 TABLET BY MOUTH TWICE A DAY WITH MEALS, Disp: 60 Tab, Rfl: 1    traMADol (ULTRAM) 50 mg tablet, Take 1 Tab by mouth every six (6) hours as needed for Pain. Max Daily Amount: 200 mg., Disp: 30 Tab, Rfl: 0    ezetimibe (ZETIA) 10 mg tablet, Take 1 Tab by mouth daily. , Disp: 90 Tab, Rfl: 3    simvastatin (ZOCOR) 40 mg tablet, TAKE 1 TAB BY MOUTH DAILY. , Disp: 90 Tab, Rfl: 2    multivitamin (ONE A DAY) tablet, Take 1 Tab by mouth daily. , Disp: , Rfl:    Date Last Reviewed:  9/28/2018   Number of Personal Factors/Comorbidities that affect the Plan of Care: 0: LOW COMPLEXITY   EXAMINATION: Observation/Orthostatic Postural Assessment:          Standing: no abnormalities noted        Ambulation: no abnormalities noted    Palpation:          Increased tone and tenderness to palpation R iliacus mm belly, iliopsoas         Mild tenderness in adductor john  ROM/Strength  Joint/Movement  Range of Motion- eval ROM - reassess  Date: 9/28  Strength- eval Strength - reassess  Date:9/28   Hip flexion R WFL R WFL 3+/5 4+/5   Hip extension R stretch felt going past neutral  4/5 4+/5   Hip abduction   4/5 4+/5             Balance:          No functional deficits noted  SFMA Top Tier (FN = Functional and Non-painful, FP = Functional and Painful, DP = Dysfunctional and Painful, DN = Dysfunctional and Non-painful)  Cervical flexion: fn  Cervical extension: fn   Cervical rotation: L fn, R fn  Upper Extremity Pattern 1 (extension, IR): L dn, R dn  Upper Extremity Pattern 2 (flexion, ER): L dn, R dn  Multi-segmental flexion: fn  Multi-segmental extension: dn  Multi-segmental rotation: L dn, R dn  Single-Leg Stance: L dn, R dn  Overhead Deep Squat: --   Summary: no pain with screen, but limited into extension and rotations     Body Structures Involved:  1. Joints  2. Muscles Body Functions Affected:  1. Sensory/Pain  2. Neuromusculoskeletal  3. Movement Related Activities and Participation Affected:  1. General Tasks and Demands  2. Self Care  3. Domestic Life  4. Community, Social and Civic Life   CLINICAL PRESENTATION:   CLINICAL DECISION MAKING:   Outcome Measure: Tool Used: Lower Extremity Functional Scale (LEFS)  Score:  Initial: 22/80 Most Recent: 79/80 (Date: 9/28/18 )   Interpretation of Score: 20 questions each scored on a 5 point scale with 0 representing \"extreme difficulty or unable to perform\" and 4 representing \"no difficulty\". The lower the score, the greater the functional disability. 80/80 represents no disability. Minimal detectable change is 9 points.   Score 80 79-63 62-48 47-32 31-16 15-1 0 Modifier CH CI CJ CK CL CM CN     Medical Necessity:   · Skilled intervention continues to be required due to decreased function. Reason for Services/Other Comments:  · Patient continues to require skilled intervention due to decreased function. TREATMENT:   (In addition to Assessment/Re-Assessment sessions the following treatments were rendered)  Pre-treatment Symptoms/Complaints:  Pt reports continuing improvement. No pain coming into therapy today. Feels that he has the tools he needs to continue his rehab by himself. Pain: Initial: 0/10     Post Session:       THERAPEUTIC EXERCISE: (45 minutes):  Exercises per grid below to improve mobility and strength. Date:  8/29 Date:  9/5 Date:  9/19 9/28   Activity/Exercise Parameters Parameters Parameters    Recumbent stepper  X 8 min level 1     Recumbent bike   X 8 min level 3 X 10 min level 3   SLR X 10 X 12 X 15 X 15   Supine hip flexor stretch 30 sec hold x 3 30 sec hold x 3, with isometric hold against therapist for contract-relax 30 sec hold x 3, with isometric hold against therapist for contract-relax 30 sec hold x 3   1/2 kneeling hip flexor stretch 10 sec hold x 3   10 sec hold x 3   Bridge with march  X 10 2 x 10 X 20   Abdominal isometrics  10 sec hold x 5 ea 10 sec hold x 5 ea 10 sec hold x 5 ea   marching  4 laps x 40 ft 4 laps x 40 ft 4 laps x 40 ft   sidestepping  4 laps x 40 ft 4 laps x 40 ft 4 laps x 40 ft   Lunge walk    4 laps x 40 ft   Step up  6\" step x 20 ea 6\" step x 20 ea forwards and lateral 6\" step x 20 ea forwards and lateral                            MANUAL THERAPY: ( minutes): To increase motion and reduce pain      MODALITIES: ( minutes):       Treatment/Session Assessment:    · Response to Treatment:  Pt reports minimal increased pain with exercises  · Compliance with Program/Exercises: Will assess as treatment progresses. · Recommendations/Intent for next treatment session:  \"Next visit will focus on advancements to more challenging activities\".   Total Treatment Duration:  PT Patient Time In/Time Out  Time In: 1045  Time Out: 1130    Future Appointments  Date Time Provider Jamey Gonzalez   10/19/2018 11:10 AM Levi Cowart MD [facility] PST PST       Zenia Luu, PT, DPT

## 2018-10-23 PROBLEM — E11.9 TYPE 2 DIABETES MELLITUS WITHOUT COMPLICATION, WITHOUT LONG-TERM CURRENT USE OF INSULIN (HCC): Status: ACTIVE | Noted: 2018-10-23

## 2019-06-13 ENCOUNTER — ANESTHESIA EVENT (OUTPATIENT)
Dept: SURGERY | Age: 59
End: 2019-06-13
Payer: OTHER MISCELLANEOUS

## 2019-06-14 ENCOUNTER — HOSPITAL ENCOUNTER (OUTPATIENT)
Age: 59
Setting detail: OUTPATIENT SURGERY
Discharge: HOME | End: 2019-06-14
Attending: ORTHOPAEDIC SURGERY | Admitting: ORTHOPAEDIC SURGERY
Payer: OTHER MISCELLANEOUS

## 2019-06-14 ENCOUNTER — ANESTHESIA (OUTPATIENT)
Dept: SURGERY | Age: 59
End: 2019-06-14
Payer: OTHER MISCELLANEOUS

## 2019-06-14 VITALS
HEART RATE: 72 BPM | BODY MASS INDEX: 31.93 KG/M2 | RESPIRATION RATE: 18 BRPM | SYSTOLIC BLOOD PRESSURE: 111 MMHG | WEIGHT: 210 LBS | OXYGEN SATURATION: 97 % | DIASTOLIC BLOOD PRESSURE: 64 MMHG | TEMPERATURE: 97.5 F

## 2019-06-14 DIAGNOSIS — L76.82 PAIN AT SURGICAL INCISION: Primary | ICD-10-CM

## 2019-06-14 LAB — GLUCOSE BLD STRIP.AUTO-MCNC: 116 MG/DL (ref 65–100)

## 2019-06-14 PROCEDURE — 74011250636 HC RX REV CODE- 250/636

## 2019-06-14 PROCEDURE — 74011250636 HC RX REV CODE- 250/636: Performed by: ANESTHESIOLOGY

## 2019-06-14 PROCEDURE — 77030018836 HC SOL IRR NACL ICUM -A: Performed by: ORTHOPAEDIC SURGERY

## 2019-06-14 PROCEDURE — 77030033005 HC TBNG ARTHSC PMP STRY -B: Performed by: ORTHOPAEDIC SURGERY

## 2019-06-14 PROCEDURE — 76060000032 HC ANESTHESIA 0.5 TO 1 HR: Performed by: ORTHOPAEDIC SURGERY

## 2019-06-14 PROCEDURE — 77030027384 HC PRB ARTHSCP SERFAS STRY -C: Performed by: ORTHOPAEDIC SURGERY

## 2019-06-14 PROCEDURE — 82962 GLUCOSE BLOOD TEST: CPT

## 2019-06-14 PROCEDURE — 77030010509 HC AIRWY LMA MSK TELE -A: Performed by: ANESTHESIOLOGY

## 2019-06-14 PROCEDURE — A4565 SLINGS: HCPCS | Performed by: ORTHOPAEDIC SURGERY

## 2019-06-14 PROCEDURE — 77030035253 HC BIT DRL ICONIX DISP STRY -B: Performed by: ORTHOPAEDIC SURGERY

## 2019-06-14 PROCEDURE — 77030006868 HC BLD SHV AUG STRY -B: Performed by: ORTHOPAEDIC SURGERY

## 2019-06-14 PROCEDURE — 77030003666 HC NDL SPINAL BD -A: Performed by: ORTHOPAEDIC SURGERY

## 2019-06-14 PROCEDURE — 74011000250 HC RX REV CODE- 250

## 2019-06-14 PROCEDURE — 76210000021 HC REC RM PH II 0.5 TO 1 HR: Performed by: ORTHOPAEDIC SURGERY

## 2019-06-14 PROCEDURE — 76010010054 HC POST OP PAIN BLOCK: Performed by: ORTHOPAEDIC SURGERY

## 2019-06-14 PROCEDURE — 76210000063 HC OR PH I REC FIRST 0.5 HR: Performed by: ORTHOPAEDIC SURGERY

## 2019-06-14 PROCEDURE — 77030006891 HC BLD SHV RESECT STRY -B: Performed by: ORTHOPAEDIC SURGERY

## 2019-06-14 PROCEDURE — 76010000160 HC OR TIME 0.5 TO 1 HR INTENSV-TIER 1: Performed by: ORTHOPAEDIC SURGERY

## 2019-06-14 PROCEDURE — 77030003602 HC NDL NRV BLK BBMI -B: Performed by: ANESTHESIOLOGY

## 2019-06-14 PROCEDURE — 77030002916 HC SUT ETHLN J&J -A: Performed by: ORTHOPAEDIC SURGERY

## 2019-06-14 PROCEDURE — 74011250636 HC RX REV CODE- 250/636: Performed by: ORTHOPAEDIC SURGERY

## 2019-06-14 PROCEDURE — 76942 ECHO GUIDE FOR BIOPSY: CPT | Performed by: ORTHOPAEDIC SURGERY

## 2019-06-14 RX ORDER — LIDOCAINE HYDROCHLORIDE 10 MG/ML
0.1 INJECTION INFILTRATION; PERINEURAL AS NEEDED
Status: DISCONTINUED | OUTPATIENT
Start: 2019-06-14 | End: 2019-06-14 | Stop reason: HOSPADM

## 2019-06-14 RX ORDER — ALBUTEROL SULFATE 0.83 MG/ML
2.5 SOLUTION RESPIRATORY (INHALATION) AS NEEDED
Status: DISCONTINUED | OUTPATIENT
Start: 2019-06-14 | End: 2019-06-14 | Stop reason: HOSPADM

## 2019-06-14 RX ORDER — LIDOCAINE HYDROCHLORIDE 20 MG/ML
INJECTION, SOLUTION EPIDURAL; INFILTRATION; INTRACAUDAL; PERINEURAL AS NEEDED
Status: DISCONTINUED | OUTPATIENT
Start: 2019-06-14 | End: 2019-06-14 | Stop reason: HOSPADM

## 2019-06-14 RX ORDER — MIDAZOLAM HYDROCHLORIDE 1 MG/ML
2 INJECTION, SOLUTION INTRAMUSCULAR; INTRAVENOUS ONCE
Status: COMPLETED | OUTPATIENT
Start: 2019-06-14 | End: 2019-06-14

## 2019-06-14 RX ORDER — OXYCODONE AND ACETAMINOPHEN 5; 325 MG/1; MG/1
1 TABLET ORAL
Qty: 24 TAB | Refills: 0 | Status: SHIPPED | OUTPATIENT
Start: 2019-06-14 | End: 2019-06-17

## 2019-06-14 RX ORDER — OXYCODONE HYDROCHLORIDE 5 MG/1
5 TABLET ORAL
Status: DISCONTINUED | OUTPATIENT
Start: 2019-06-14 | End: 2019-06-14 | Stop reason: HOSPADM

## 2019-06-14 RX ORDER — ONDANSETRON 2 MG/ML
INJECTION INTRAMUSCULAR; INTRAVENOUS AS NEEDED
Status: DISCONTINUED | OUTPATIENT
Start: 2019-06-14 | End: 2019-06-14 | Stop reason: HOSPADM

## 2019-06-14 RX ORDER — CEFAZOLIN SODIUM/WATER 2 G/20 ML
2 SYRINGE (ML) INTRAVENOUS ONCE
Status: COMPLETED | OUTPATIENT
Start: 2019-06-14 | End: 2019-06-14

## 2019-06-14 RX ORDER — MIDAZOLAM HYDROCHLORIDE 1 MG/ML
2 INJECTION, SOLUTION INTRAMUSCULAR; INTRAVENOUS
Status: DISCONTINUED | OUTPATIENT
Start: 2019-06-14 | End: 2019-06-14 | Stop reason: HOSPADM

## 2019-06-14 RX ORDER — OXYCODONE HYDROCHLORIDE 5 MG/1
10 TABLET ORAL
Status: DISCONTINUED | OUTPATIENT
Start: 2019-06-14 | End: 2019-06-14 | Stop reason: HOSPADM

## 2019-06-14 RX ORDER — SODIUM CHLORIDE, SODIUM LACTATE, POTASSIUM CHLORIDE, CALCIUM CHLORIDE 600; 310; 30; 20 MG/100ML; MG/100ML; MG/100ML; MG/100ML
100 INJECTION, SOLUTION INTRAVENOUS CONTINUOUS
Status: DISCONTINUED | OUTPATIENT
Start: 2019-06-14 | End: 2019-06-14 | Stop reason: HOSPADM

## 2019-06-14 RX ORDER — PROPOFOL 10 MG/ML
INJECTION, EMULSION INTRAVENOUS AS NEEDED
Status: DISCONTINUED | OUTPATIENT
Start: 2019-06-14 | End: 2019-06-14 | Stop reason: HOSPADM

## 2019-06-14 RX ORDER — DIPHENHYDRAMINE HYDROCHLORIDE 50 MG/ML
12.5 INJECTION, SOLUTION INTRAMUSCULAR; INTRAVENOUS
Status: DISCONTINUED | OUTPATIENT
Start: 2019-06-14 | End: 2019-06-14 | Stop reason: HOSPADM

## 2019-06-14 RX ORDER — ONDANSETRON 2 MG/ML
4 INJECTION INTRAMUSCULAR; INTRAVENOUS ONCE
Status: DISCONTINUED | OUTPATIENT
Start: 2019-06-14 | End: 2019-06-14 | Stop reason: HOSPADM

## 2019-06-14 RX ORDER — BUPIVACAINE HYDROCHLORIDE AND EPINEPHRINE 2.5; 5 MG/ML; UG/ML
INJECTION, SOLUTION EPIDURAL; INFILTRATION; INTRACAUDAL; PERINEURAL
Status: COMPLETED | OUTPATIENT
Start: 2019-06-14 | End: 2019-06-14

## 2019-06-14 RX ORDER — NALOXONE HYDROCHLORIDE 0.4 MG/ML
0.1 INJECTION, SOLUTION INTRAMUSCULAR; INTRAVENOUS; SUBCUTANEOUS AS NEEDED
Status: DISCONTINUED | OUTPATIENT
Start: 2019-06-14 | End: 2019-06-14 | Stop reason: HOSPADM

## 2019-06-14 RX ORDER — FENTANYL CITRATE 50 UG/ML
100 INJECTION, SOLUTION INTRAMUSCULAR; INTRAVENOUS ONCE
Status: DISCONTINUED | OUTPATIENT
Start: 2019-06-14 | End: 2019-06-14 | Stop reason: HOSPADM

## 2019-06-14 RX ORDER — HYDROMORPHONE HYDROCHLORIDE 2 MG/ML
0.5 INJECTION, SOLUTION INTRAMUSCULAR; INTRAVENOUS; SUBCUTANEOUS
Status: DISCONTINUED | OUTPATIENT
Start: 2019-06-14 | End: 2019-06-14 | Stop reason: HOSPADM

## 2019-06-14 RX ADMIN — Medication 2 G: at 10:33

## 2019-06-14 RX ADMIN — ONDANSETRON 4 MG: 2 INJECTION INTRAMUSCULAR; INTRAVENOUS at 11:00

## 2019-06-14 RX ADMIN — LIDOCAINE HYDROCHLORIDE 100 MG: 20 INJECTION, SOLUTION EPIDURAL; INFILTRATION; INTRACAUDAL; PERINEURAL at 10:28

## 2019-06-14 RX ADMIN — SODIUM CHLORIDE, SODIUM LACTATE, POTASSIUM CHLORIDE, AND CALCIUM CHLORIDE 100 ML/HR: 600; 310; 30; 20 INJECTION, SOLUTION INTRAVENOUS at 08:30

## 2019-06-14 RX ADMIN — PROPOFOL 200 MG: 10 INJECTION, EMULSION INTRAVENOUS at 10:28

## 2019-06-14 RX ADMIN — MIDAZOLAM HYDROCHLORIDE 2 MG: 2 INJECTION, SOLUTION INTRAMUSCULAR; INTRAVENOUS at 08:57

## 2019-06-14 RX ADMIN — BUPIVACAINE HYDROCHLORIDE AND EPINEPHRINE 30 ML: 2.5; 5 INJECTION, SOLUTION EPIDURAL; INFILTRATION; INTRACAUDAL; PERINEURAL at 09:00

## 2019-06-14 NOTE — ANESTHESIA PREPROCEDURE EVALUATION
Relevant Problems   No relevant active problems       Anesthetic History               Review of Systems / Medical History  Patient summary reviewed, nursing notes reviewed and pertinent labs reviewed    Pulmonary                   Neuro/Psych              Cardiovascular                  Exercise tolerance: >4 METS     GI/Hepatic/Renal                Endo/Other             Other Findings              Physical Exam    Airway  Mallampati: III  TM Distance: 4 - 6 cm  Neck ROM: normal range of motion   Mouth opening: Normal     Cardiovascular  Regular rate and rhythm,  S1 and S2 normal,  no murmur, click, rub, or gallop             Dental  No notable dental hx       Pulmonary  Breath sounds clear to auscultation               Abdominal         Other Findings            Anesthetic Plan    ASA: 2  Anesthesia type: general      Post-op pain plan if not by surgeon: peripheral nerve block single    Induction: Intravenous  Anesthetic plan and risks discussed with: Patient and Spouse

## 2019-06-14 NOTE — ANESTHESIA POSTPROCEDURE EVALUATION
Procedure(s):  RIGHT SHOULDER ARTHROSCOPY SUBACROMIAL DECOMPRESSION / DISTAL CLAVICLE RESECTION /.    general    Anesthesia Post Evaluation      Multimodal analgesia: multimodal analgesia used between 6 hours prior to anesthesia start to PACU discharge  Patient location during evaluation: PACU  Patient participation: complete - patient participated  Level of consciousness: awake and awake and alert  Pain management: adequate  Airway patency: patent  Anesthetic complications: no  Cardiovascular status: acceptable  Respiratory status: acceptable  Hydration status: acceptable  Post anesthesia nausea and vomiting:  controlled      Vitals Value Taken Time   /68 6/14/2019 11:50 AM   Temp 36.4 °C (97.5 °F) 6/14/2019 11:21 AM   Pulse 76 6/14/2019 11:53 AM   Resp 45 6/14/2019 11:53 AM   SpO2 97 % 6/14/2019 11:53 AM   Vitals shown include unvalidated device data.

## 2019-06-14 NOTE — OP NOTES
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 The arthroscope was inserted through a posterior portal  and the interior of the joint was examined. The labrum was intact. The biceps tendon was not visualized. The articular surfaces were normal. There was some degeneration of the cuff but there was not a full tear. The scope was then removed from the shoulder joint and then placed into the subacromial space. The bursal tissue was removed and visualization was obtained. The anterior acromion was prominent. A subacromial decompression was performed through the posterior portal using a cutting block technique. This provided a good decompression of the subacromial space. The rotator cuff was examined and was normal.         The distal clavicle was very degenerative. A distal clavicle resection was done through the anterior portal. The arthroscopic les was used to remove approximately 1 centimeter of the bone. The stab wounds were then closed with simple nylon sutures. A sterile dressing was applied. A sling was placed as well. The patient was then taken to the recovery room in satisfactory condition.           Elpidio Mcgraw M.D.

## 2019-06-14 NOTE — DISCHARGE INSTRUCTIONS
Post-Operative Instructions   For   [facility]  Shoulder Arthroscopy  Phone:  [phone]    1. Apply ice to the shoulder as needed. 2.   Keep dressings in place for the first two days. After two days you  may remove the dressings. Keep the stab wounds clean. The small stitches will be removed when you come to the office. You can cover them with small band aids. 3. You may discontinue use of your sling and begin active range of motion of the shoulder as tolerated by pain, unless you are instructed otherwise. 4. You may shower after the dressing comes off. Cover the small wounds with  waterproof band aids. 5. Use  pain medication as instructed on the bottle. If you have pain medication from another physcian do not use it with this medication. 6. You may have some side effects from your pain medication. If you have nausea, try taking your medication with food. For itching, you may take over the counter Benadryl. 7. If you have a problem, please call [address] [place] [address] at [phone]    Carolina Hilliard M.D. [facility]     ·     DIET  · Clear liquids until no nausea or vomiting; then light diet for the first day. · Advance to regular diet on second day, unless your doctor orders otherwise. · If nausea and vomiting continues, call your doctor. · Avoid greasy and spicy food today to reduce nausea. PAIN  · Take pain medication as directed by your doctor. · Call your doctor if pain is NOT relieved by medication. · DO NOT take aspirin of blood thinners unless directed by your doctor. · Take pain pills with food to reduce nausea  · Pain pills may cause constipation.  May use stool softener      CALL YOUR DOCTOR IF   · Excessive bleeding that does not stop after holding pressure over the area  · Temperature of 101 degrees F or above  · Excessive redness, swelling or bruising, and/ or green or yellow, smelly discharge from incision    AFTER ANESTHESIA   · For the first 24 hours: DO NOT Drive, Drink alcoholic beverages, or Make important decisions. · Be aware of dizziness following anesthesia and while taking pain medication. APPOINTMENT DATE/ TIME: Keep scheduled appointment     YOUR DOCTOR'S PHONE NUMBER [phone]      DISCHARGE SUMMARY from Nurse    PATIENT INSTRUCTIONS:    After general anesthesia or intravenous sedation, for 24 hours or while taking prescription Narcotics:  · Limit your activities  · Do not drive and operate hazardous machinery  · Do not make important personal or business decisions  · Do  not drink alcoholic beverages  · If you have not urinated within 8 hours after discharge, please contact your surgeon on call. *  Please give a list of your current medications to your Primary Care Provider. *  Please update this list whenever your medications are discontinued, doses are      changed, or new medications (including over-the-counter products) are added. *  Please carry medication information at all times in case of emergency situations. These are general instructions for a healthy lifestyle:    No smoking/ No tobacco products/ Avoid exposure to second hand smoke    Surgeon General's Warning:  Quitting smoking now greatly reduces serious risk to your health. Obesity, smoking, and sedentary lifestyle greatly increases your risk for illness    A healthy diet, regular physical exercise & weight monitoring are important for maintaining a healthy lifestyle    You may be retaining fluid if you have a history of heart failure or if you experience any of the following symptoms:  Weight gain of 3 pounds or more overnight or 5 pounds in a week, increased swelling in our hands or feet or shortness of breath while lying flat in bed. Please call your doctor as soon as you notice any of these symptoms; do not wait until your next office visit.     Recognize signs and symptoms of STROKE:    F-face looks uneven    A-arms unable to move or move unevenly    S-speech slurred or non-existent    T-time-call 911 as soon as signs and symptoms begin-DO NOT go       Back to bed or wait to see if you get better-TIME IS BRAIN.

## 2019-06-14 NOTE — BRIEF OP NOTE
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

## 2019-06-14 NOTE — ANESTHESIA PROCEDURE NOTES
Peripheral Block    Start time: 6/14/2019 8:57 AM  End time: 6/14/2019 9:00 AM  Performed by: Katina Coronel MD  Authorized by: Katina Coronel MD       Pre-procedure: Indications: at surgeon's request and post-op pain management    Preanesthetic Checklist: patient identified, risks and benefits discussed, site marked, timeout performed, anesthesia consent given and patient being monitored    Timeout Time: 08:57          Block Type:   Block Type:   Interscalene  Laterality:  Right  Monitoring:  Standard ASA monitoring, continuous pulse ox, frequent vital sign checks, heart rate, oxygen and responsive to questions  Injection Technique:  Single shot  Procedures: ultrasound guided    Patient Position: supine  Prep: chlorhexidine    Location:  Interscalene  Needle Type:  Stimuplex  Needle Gauge:  21 G  Needle Localization:  Ultrasound guidance and anatomical landmarks    Assessment:  Number of attempts:  1  Injection Assessment:  Incremental injection every 5 mL, local visualized surrounding nerve on ultrasound, negative aspiration for blood, no paresthesia, no intravascular symptoms and ultrasound image on chart  Patient tolerance:  Patient tolerated the procedure well with no immediate complications

## 2019-06-14 NOTE — H&P
Outpatient Surgery History and Physical:  Kailyn Wright was seen and examined. CHIEF COMPLAINT:   Right shoulder. PE:     Visit Vitals  /89 (BP 1 Location: Left arm, BP Patient Position: At rest)   Pulse 66   Temp 97.9 °F (36.6 °C)   Resp 17   Wt 95.3 kg (210 lb)   SpO2 98%   BMI 31.93 kg/m²       Heart:   Regular rhythm      Lungs:  Are clear      Past Medical History:    Patient Active Problem List    Diagnosis    Type 2 diabetes mellitus without complication, without long-term current use of insulin ([address]    Prediabetes    Hypercholesterolemia       Surgical History:   Past Surgical History:   Procedure Laterality Date    HX APPENDECTOMY  [id number]    HX COLONOSCOPY      HX HERNIA REPAIR  2004    [address]       Social History: Patient  reports that he quit smoking about 6 years ago. He has a 3.75 pack-year smoking history. He has never used smokeless tobacco. He reports that he does not drink alcohol or use drugs. Family History:   Family History   Problem Relation Age of Onset   Verlinda Smoker Cancer Sister         breast    Cancer Mother     No Known Problems Father        Allergies: Reviewed per EMR  No Known Allergies    Medications:    No current facility-administered medications on file prior to encounter. Current Outpatient Medications on File Prior to Encounter   Medication Sig    empagliflozin (JARDIANCE) 25 mg tablet Take 1 Tab by mouth daily.  ezetimibe (ZETIA) 10 mg tablet TAKE 1 TAB BY MOUTH DAILY.  simvastatin (ZOCOR) 40 mg tablet TAKE 1 TAB BY MOUTH DAILY.  multivitamin (ONE A DAY) tablet Take 1 Tab by mouth daily.  naproxen (NAPROSYN) 500 mg tablet TAKE 1 TABLET BY MOUTH TWICE A DAY WITH MEALS    traMADol (ULTRAM) 50 mg tablet Take 1 Tab by mouth every six (6) hours as needed for Pain. Max Daily Amount: 200 mg. The surgery is planned for the right shoulder SAD DCR. The patient is here today for outpatient surgery.  I have examined the patient, no changes are noted in the patient's medical status. Necessity for the procedure/care is still present and the history and physical above is current. See the office notes for the full long term history of the problem. Please see the recent office notes for the musculoskeletal examination.     Signed By: Candance Jo, MD     June 14, 2019 8:39 AM

## 2020-10-08 ENCOUNTER — ANESTHESIA EVENT (OUTPATIENT)
Dept: ENDOSCOPY | Age: 60
End: 2020-10-08
Payer: COMMERCIAL

## 2020-10-08 RX ORDER — SODIUM CHLORIDE, SODIUM LACTATE, POTASSIUM CHLORIDE, CALCIUM CHLORIDE 600; 310; 30; 20 MG/100ML; MG/100ML; MG/100ML; MG/100ML
100 INJECTION, SOLUTION INTRAVENOUS CONTINUOUS
Status: CANCELLED | OUTPATIENT
Start: 2020-10-08

## 2020-10-08 NOTE — PROGRESS NOTES
Confirmed appointment with patient via phone. Patient understands arrival time and procedure time. Ride will be present. No other concerns noted at this time.

## 2020-10-09 ENCOUNTER — HOSPITAL ENCOUNTER (OUTPATIENT)
Age: 60
Setting detail: OUTPATIENT SURGERY
Discharge: HOME | End: 2020-10-09
Attending: SURGERY | Admitting: SURGERY
Payer: COMMERCIAL

## 2020-10-09 ENCOUNTER — ANESTHESIA (OUTPATIENT)
Dept: ENDOSCOPY | Age: 60
End: 2020-10-09
Payer: COMMERCIAL

## 2020-10-09 VITALS
BODY MASS INDEX: 31.98 KG/M2 | HEIGHT: 68 IN | SYSTOLIC BLOOD PRESSURE: 127 MMHG | RESPIRATION RATE: 15 BRPM | HEART RATE: 67 BPM | TEMPERATURE: 98.6 F | DIASTOLIC BLOOD PRESSURE: 80 MMHG | OXYGEN SATURATION: 100 % | WEIGHT: 211 LBS

## 2020-10-09 DIAGNOSIS — K64.8 INTERNAL HEMORRHOIDS WITHOUT COMPLICATION: ICD-10-CM

## 2020-10-09 DIAGNOSIS — Z12.11 COLON CANCER SCREENING: ICD-10-CM

## 2020-10-09 LAB — GLUCOSE BLD STRIP.AUTO-MCNC: 149 MG/DL (ref 65–100)

## 2020-10-09 PROCEDURE — 45378 DIAGNOSTIC COLONOSCOPY: CPT | Performed by: SURGERY

## 2020-10-09 PROCEDURE — 2709999900 HC NON-CHARGEABLE SUPPLY: Performed by: SURGERY

## 2020-10-09 PROCEDURE — 76040000025: Performed by: SURGERY

## 2020-10-09 PROCEDURE — 74011250636 HC RX REV CODE- 250/636: Performed by: ANESTHESIOLOGY

## 2020-10-09 PROCEDURE — 74011250636 HC RX REV CODE- 250/636: Performed by: STUDENT IN AN ORGANIZED HEALTH CARE EDUCATION/TRAINING PROGRAM

## 2020-10-09 PROCEDURE — 74011000250 HC RX REV CODE- 250: Performed by: STUDENT IN AN ORGANIZED HEALTH CARE EDUCATION/TRAINING PROGRAM

## 2020-10-09 PROCEDURE — 76060000031 HC ANESTHESIA FIRST 0.5 HR: Performed by: SURGERY

## 2020-10-09 PROCEDURE — 82962 GLUCOSE BLOOD TEST: CPT

## 2020-10-09 RX ORDER — SODIUM CHLORIDE, SODIUM LACTATE, POTASSIUM CHLORIDE, CALCIUM CHLORIDE 600; 310; 30; 20 MG/100ML; MG/100ML; MG/100ML; MG/100ML
100 INJECTION, SOLUTION INTRAVENOUS CONTINUOUS
Status: DISCONTINUED | OUTPATIENT
Start: 2020-10-09 | End: 2020-10-09 | Stop reason: HOSPADM

## 2020-10-09 RX ORDER — SODIUM CHLORIDE, SODIUM LACTATE, POTASSIUM CHLORIDE, CALCIUM CHLORIDE 600; 310; 30; 20 MG/100ML; MG/100ML; MG/100ML; MG/100ML
INJECTION, SOLUTION INTRAVENOUS
Status: DISCONTINUED | OUTPATIENT
Start: 2020-10-09 | End: 2020-10-09 | Stop reason: HOSPADM

## 2020-10-09 RX ORDER — LIDOCAINE HYDROCHLORIDE 20 MG/ML
INJECTION, SOLUTION EPIDURAL; INFILTRATION; INTRACAUDAL; PERINEURAL AS NEEDED
Status: DISCONTINUED | OUTPATIENT
Start: 2020-10-09 | End: 2020-10-09 | Stop reason: HOSPADM

## 2020-10-09 RX ORDER — PROPOFOL 10 MG/ML
INJECTION, EMULSION INTRAVENOUS
Status: DISCONTINUED | OUTPATIENT
Start: 2020-10-09 | End: 2020-10-09 | Stop reason: HOSPADM

## 2020-10-09 RX ORDER — PROPOFOL 10 MG/ML
INJECTION, EMULSION INTRAVENOUS AS NEEDED
Status: DISCONTINUED | OUTPATIENT
Start: 2020-10-09 | End: 2020-10-09 | Stop reason: HOSPADM

## 2020-10-09 RX ADMIN — PROPOFOL 70 MG: 10 INJECTION, EMULSION INTRAVENOUS at 10:36

## 2020-10-09 RX ADMIN — PHENYLEPHRINE HYDROCHLORIDE 50 MCG: 10 INJECTION INTRAVENOUS at 10:53

## 2020-10-09 RX ADMIN — PHENYLEPHRINE HYDROCHLORIDE 50 MCG: 10 INJECTION INTRAVENOUS at 10:49

## 2020-10-09 RX ADMIN — LIDOCAINE HYDROCHLORIDE 50 MG: 20 INJECTION, SOLUTION EPIDURAL; INFILTRATION; INTRACAUDAL; PERINEURAL at 10:36

## 2020-10-09 RX ADMIN — PROPOFOL 160 MCG/KG/MIN: 10 INJECTION, EMULSION INTRAVENOUS at 10:36

## 2020-10-09 RX ADMIN — SODIUM CHLORIDE, SODIUM LACTATE, POTASSIUM CHLORIDE, AND CALCIUM CHLORIDE: 600; 310; 30; 20 INJECTION, SOLUTION INTRAVENOUS at 10:33

## 2020-10-09 RX ADMIN — SODIUM CHLORIDE, SODIUM LACTATE, POTASSIUM CHLORIDE, AND CALCIUM CHLORIDE 100 ML/HR: 600; 310; 30; 20 INJECTION, SOLUTION INTRAVENOUS at 09:56

## 2020-10-09 NOTE — ANESTHESIA PREPROCEDURE EVALUATION
Relevant Problems   No relevant active problems       Anesthetic History   No history of anesthetic complications            Review of Systems / Medical History  Patient summary reviewed and pertinent labs reviewed    Pulmonary          Smoker (Former smoker.  Quit about 8 years ago.)         Neuro/Psych   Within defined limits           Cardiovascular              Hyperlipidemia    Exercise tolerance: >4 METS  Comments: Denies recent CP, SOB or Palpitations   GI/Hepatic/Renal  Within defined limits              Endo/Other    Diabetes: well controlled, type 2         Other Findings              Physical Exam    Airway  Mallampati: III  TM Distance: 4 - 6 cm  Neck ROM: normal range of motion   Mouth opening: Normal     Cardiovascular  Regular rate and rhythm,  S1 and S2 normal,  no murmur, click, rub, or gallop             Dental  No notable dental hx       Pulmonary  Breath sounds clear to auscultation               Abdominal  GI exam deferred       Other Findings            Anesthetic Plan    ASA: 2  Anesthesia type: total IV anesthesia          Induction: Intravenous  Anesthetic plan and risks discussed with: Patient

## 2020-10-09 NOTE — DISCHARGE INSTRUCTIONS
Gastrointestinal Colonoscopy/Flexible Sigmoidoscopy - Lower Exam Discharge Instructions  1. Call Dr. Janell Brenner at [phone] for any problems or questions. 2. Contact the doctors office for follow up appointment as directed  3. Medication may cause drowsiness for several hours, therefore:  · Do not drive or operate machinery for reminder of the day. · No alcohol today. · Do not make any important or legal decisions for 24 hours. · Do not sign any legal documents for 24 hours. 4. Ordinarily, you may resume regular diet and activity after exam unless otherwise specified by your physician. 5. Because of air put into your colon during exam, you may experience some abdominal distension, relieved by the passage of gas, for several hours. 6. Contact your physician if you have any of the following:  · Excessive amount of bleeding - large amount when having a bowel movement. Occasional specks of blood with bowel movement would not be unusual.  · Severe abdominal pain  · Fever or Chills  Any additional instructions:  1. Repeat in 10 years       Instructions given to Demetra Bria and other family members.

## 2020-10-09 NOTE — H&P
History and Physical      Patient: Kanchan Gardner    Physician: Jerod Cuadra MD    Referring Physician: Joshua Lisa DO    Chief Complaint: For colonoscopy    History of Present Illness: Pt presents for colonoscopy. Had neg exam  (Dr Anson Jacobson). History:  Past Medical History:   Diagnosis Date    H/O appendicitis     H/O hernia repair     History of cellulitis     Hyperlipidemia     managed with meds    Prediabetes     Type 2 diabetes mellitus without complication, without long-term current use of insulin ([facility])      type ll, does not chk glucose (borderline) last A1C 6.6    Visit for dental examination 2017     Past Surgical History:   Procedure Laterality Date    HX APPENDECTOMY  1984    HX COLONOSCOPY      HX HERNIA REPAIR      [address]    HX SHOULDER ARTHROSCOPY Right 2019      Social History     Socioeconomic History    Marital status:      Spouse name: Not on file    Number of children: Not on file    Years of education: Not on file    Highest education level: Not on file   Tobacco Use    Smoking status: Former Smoker     Packs/day: 0.25     Years: 15.00     Pack years: 3.75     Last attempt to quit: 2013     Years since quittin.3    Smokeless tobacco: Never Used   Substance and Sexual Activity    Alcohol use: No    Drug use: No      Family History   Problem Relation Age of Onset    Cancer Sister         breast    Cancer Mother     No Known Problems Father        Medications:   Prior to Admission medications    Medication Sig Start Date End Date Taking? Authorizing Provider   simvastatin (ZOCOR) 40 mg tablet TAKE 1 TAB BY MOUTH DAILY. Patient taking differently: every evening. TAKE 1 TAB BY MOUTH DAILY. 4/15/20   Jay AYALA DO   glimepiride (AMARYL) 4 mg tablet Take 1 Tab by mouth every morning.  20   Ulises Blankenship MD   naproxen (NAPROSYN) 500 mg tablet TAKE 1 TABLET BY MOUTH TWICE A DAY WITH MEALS 20   Ulises Blankenship MD ezetimibe (ZETIA) 10 mg tablet Take 1 Tab by mouth daily. Patient taking differently: Take 10 mg by mouth every evening. 1/24/20   Adi Penn MD   empagliflozin (JARDIANCE) 25 mg tablet Take 1 Tab by mouth daily. 1/24/20   Adi Penn MD   multivitamin (ONE A DAY) tablet Take 1 Tab by mouth daily. Provider, Historical       Allergies: No Known Allergies    Physical Exam:     Vital Signs:   Visit Vitals  BP (!) 112/58   Pulse 62   Temp 97.5 °F (36.4 °C)   Resp 18   Ht 5' 8\" (1.727 m)   Wt 211 lb (95.7 kg)   SpO2 98%   BMI 32.08 kg/m²     . General: no distress      Heart: regular   Lungs: unlabored   Abdominal: soft   Neurological: Grossly normal        Findings/Diagnosis: Screening for colorectal cancer     Plan of Care/Planned Procedure: Colonoscopy, possible polypectomy. Pt/designee has reviewed the colonoscopy information sheet. Any questions have been discussed. They agree to proceed.       Signed:  Donato Biggs MD   10/9/2020

## 2020-10-09 NOTE — PROCEDURES
Procedure in Detail:  Informed consent was obtained for the procedure. The patient was placed in the left lateral decubitus position and sedation was induced by anesthesia. The scope was inserted into the rectum and advanced under direct vision to the cecum, which was identified by the ileocecal valve and appendiceal orifice. The quality of the colonic preparation was good. A careful inspection was made as the colonoscope was withdrawn, including a retroflexed view of the rectum; findings and interventions are described below. Appropriate photodocumentation was obtained. Findings:   ANUS: Anal exam reveals no masses or hemorrhoids, sphincter tone is normal.   RECTUM: Rectal exam reveals no masses; mild internal hemorrhoids noted on retroflexion, without bleeding. SIGMOID COLON: The mucosa is normal with good vascular pattern and without ulcers, diverticula, and polyps. DESCENDING COLON: The mucosa is normal with good vascular pattern and without ulcers, diverticula, and polyps. SPLENIC FLEXURE: The splenic flexure is normal.   TRANSVERSE COLON: The mucosa is normal with good vascular pattern and without ulcers, diverticula, and polyps. HEPATIC FLEXURE: The hepatic flexure is normal.   ASCENDING COLON: The mucosa is normal with good vascular pattern and without ulcers, diverticula, and polyps. CECUM: The appendiceal orifice appears normal. The ileocecal valve appears normal.   TERMINAL ILEUM: The terminal ileum was not entered. Specimens: No specimens were collected. Complications: None; patient tolerated the procedure well. \    EBL - none    Recommendations:   - For colon cancer screening in this average-risk patient, colonoscopy may be repeated in 10 years.      Signed By: Afshan Pastor MD                        October 9, 2020

## 2020-10-09 NOTE — ANESTHESIA POSTPROCEDURE EVALUATION
Procedure(s):  COLONOSCOPY/ BMI 32.    total IV anesthesia    Anesthesia Post Evaluation      Multimodal analgesia: multimodal analgesia used between 6 hours prior to anesthesia start to PACU discharge  Patient location during evaluation: PACU  Patient participation: complete - patient participated  Level of consciousness: awake  Pain management: adequate  Airway patency: patent  Anesthetic complications: no  Cardiovascular status: acceptable  Respiratory status: spontaneous ventilation and acceptable  Hydration status: acceptable  Post anesthesia nausea and vomiting:  none      INITIAL Post-op Vital signs:   Vitals Value Taken Time   /80 10/9/2020 11:30 AM   Temp 37 °C (98.6 °F) 10/9/2020 11:01 AM   Pulse 63 10/9/2020 11:31 AM   Resp 14 10/9/2020 11:09 AM   SpO2 98 % 10/9/2020 11:31 AM   Vitals shown include unvalidated device data.

## 2021-11-11 ENCOUNTER — HOSPITAL ENCOUNTER (OUTPATIENT)
Dept: SLEEP MEDICINE | Age: 61
Discharge: HOME | End: 2021-11-11
Payer: COMMERCIAL

## 2021-11-11 PROCEDURE — 95810 POLYSOM 6/> YRS 4/> PARAM: CPT

## 2022-03-19 PROBLEM — E11.9 TYPE 2 DIABETES MELLITUS WITHOUT COMPLICATION, WITHOUT LONG-TERM CURRENT USE OF INSULIN (HCC): Status: ACTIVE | Noted: 2018-10-23

## 2022-07-20 NOTE — PROGRESS NOTES
[facility]  [address] [facility] Alec Albarran, [address] [place] [address] Court, [address] [facility]  [phone]    Patient Name:  Tanja Varma  YOB: 1960      Office Visit 7/21/2022    CHIEF COMPLAINT:    Chief Complaint   Patient presents with    New Patient    Sleep Study    Results    Sleep Apnea       HISTORY OF PRESENT ILLNESS:      The patient present in outpatient consultation at the request of Linda Hargrove NP for evaluation and management of obstructive sleep apnea. The patient underwent a recent diagnostic polysomnography because of symptoms including snoring, witnessed apneas, daytime fatigue. There is no history of cataplexy, hypnagogic hallucinations, or sleep paralysis. In addition, there is no history of frequent leg movements, kicking at night, or an inability to keep the legs still. The patient indicated that he go to sleep between 930 and 10 PM and wake up between 2 and 3:00 a.m. He was working 12-hour shifts for at least the last 15 years but currently his job has changed to 10-hour shift. His Job is very demanding including lifting and working the . He served in [employer] for about 21 years in the [employer] and he retired from that. He does not feel refreshed upon awakening in the morning. He quit smoking 7 years ago and he quit drinking alcohol last year. When he became diabetic last year he realized he had to make significant change in his lifestyle and he started increasing his physical activity including biking and he lost approximately 14 pounds since last year. He was congratulated on that. The diagnostic polysomnography was notable for a respiratory disturbance index of 13.2/hour. Oxygen desaturations are low as sent were noted. Significant cardiac arrhythmias were not evident. The patient was noted to have intermittent limb movements with a limb movement arousal index being about 1.1/hour.   I discussed with the patient the study findings and reviewed the pathophysiology of sleep apnea and treatment options including trial of CPAP therapy and oral appliance and positional therapy.   He agreed to proceed with a CPAP tried along with improving his sleep hygiene in general to maximize his benefit of CPAP therapy      Sleepiness Scale:     Sitting and reading 0    Watching TV 1    Sitting inactive in a public place 0    As a passenger in a car for an hour without a break 0    Lying down to rest in the afternoon when circumstances Permits 1    Sitting and talking to someone 0    Sitting quietly after lunch without alcohol 1    In a car, while stopped for a few minutes 0    Total :  3/24    Past Medical History:   Diagnosis Date    H/O appendicitis     H/O hernia repair     History of cellulitis     Hyperlipidemia     managed with meds    Prediabetes     Type 2 diabetes mellitus without complication, without long-term current use of insulin ([address]      type ll, does not chk glucose (borderline) last A1C 6.6    Visit for dental examination 03/22/2017       Patient Active Problem List   Diagnosis    Prediabetes    Hypercholesterolemia    Type 2 diabetes mellitus without complication, without long-term current use of insulin (HCC)    YOLA (obstructive sleep apnea)    Nocturnal hypoxemia    Inadequate sleep hygiene    PLMD (periodic limb movement disorder)       Past Surgical History:   Procedure Laterality Date    APPENDECTOMY  1984    COLONOSCOPY      COLONOSCOPY N/A 10/9/2020    COLONOSCOPY/ BMI 32 performed by Georgiana Gottlieb MD at [facility] ENDOSCOPY    COLONOSCOPY FLX DX W/COLLJ SPEC WHEN PFRMD  10/9/2020         HERNIA REPAIR  2004    [facility]    SHOULDER ARTHROSCOPY Right 2019       [unfilled]    Social History     Socioeconomic History    Marital status:      Spouse name: Not on file    Number of children: Not on file    Years of education: Not on file    Highest education level: Not on file   Occupational History    Not on file   Tobacco Use    Smoking status: Former     Packs/day: 0.25     Types: Cigarettes     Quit date: 2013     Years since quittin.1    Smokeless tobacco: Never   Substance and Sexual Activity    Alcohol use: No    Drug use: No    Sexual activity: Not on file   Other Topics Concern    Not on file   Social History Narrative    Not on file     Social Determinants of Health     Financial Resource Strain: Not on file   Food Insecurity: Not on file   Transportation Needs: Not on file   Physical Activity: Not on file   Stress: Not on file   Social Connections: Not on file   Intimate Partner Violence: Not on file   Housing Stability: Not on file         Family History   Problem Relation Age of Onset    Cancer Mother     Cancer Sister         breast    No Known Problems Father          No Known Allergies      Current Outpatient Medications   Medication Sig    Lancets MISC Take BS as directed BID. E11.9    empagliflozin (JARDIANCE) 25 MG tablet Take 25 mg by mouth daily    ezetimibe (ZETIA) 10 MG tablet Take 10 mg by mouth daily    fluticasone (FLONASE) 50 MCG/ACT nasal spray 2 sprays by Nasal route daily    glimepiride (AMARYL) 4 MG tablet Take 4 mg by mouth    naproxen (NAPROSYN) 500 MG tablet TAKE 1 TABLET BY MOUTH TWICE A DAY WITH MEALS    simvastatin (ZOCOR) 40 MG tablet TAKE 1 TAB BY MOUTH DAILY. No current facility-administered medications for this visit. REVIEW OF SYSTEMS:   CONSTITUTIONAL:   There is no history of fever, chills, night sweats, weight loss, weight gain, persistent fatigue, or lethargy/hypersomnolence. EYES:   Denies problems with eye pain, erythema, blurred vision, or visual field loss. ENTM:   Denies history of tinnitus, epistaxis, sore throat, hoarseness, or dysphonia. LYMPH:   Denies swollen glands. CARDIAC:   No chest pain, pressure, discomfort, palpitations, orthopnea, murmurs, or edema.    GI:   No dysphagia, heartburn reflux, nausea/vomiting, diarrhea, abdominal pain, or bleeding. :   Denies history of dysuria, hematuria, polyuria, or decreased urine output. MS:   No history of myalgias, arthralgias, bone pain, or muscle cramps. SKIN:   No history of rashes, jaundice, cyanosis, nodules, or ulcers. ENDO:   Negative for heat or cold intolerance. No history of DM. PSYCH:   Negative for anxiety, depression, insomnia, hallucinations. NEURO:   There is no history of AMS, persistent headache, decreased level of consciousness, seizures, or motor or sensory deficits. PHYSICAL EXAM:    Vitals:    07/21/22 0824   BP: 118/68   Pulse: 90   Resp: 14   Temp: 97.2 °F (36.2 °C)   SpO2: 98%        GENERAL APPEARANCE:   The patient is normal weight and in no respiratory distress. HEENT:   PERRL. Conjunctivae unremarkable. Nasal mucosa is without epistaxis, exudate, or polyps. Gums and dentition are unremarkable. There is severe ropharyngeal narrowing. He is Mallampati 3     NECK/LYMPHATIC:   Symmetrical with no elevation of jugular venous pulsation. Trachea midline. No thyroid enlargement. No cervical adenopathy. LUNGS:   Normal respiratory effort with symmetrical lung expansion. Breath sounds are diminished in the bases. HEART:   There is a regular rate and rhythm. No murmur, rub, or gallop. There is no edema in the lower extremities. ABDOMEN:   Soft and non-tender. No hepatosplenomegaly. Bowel sounds are normal.     SKIN:   There are no rashes, cyanosis, jaundice, or ecchymosis present. EXTREMITIES:   The extremities are unremarkable without clubbing, cyanosis, joint inflammation, degenerative, or ischemic change. MUSCULOSKELETAL:   There is no abnormal tone, muscle atrophy, or abnormal movement present. NEURO:   The patient is alert and oriented to person, place, and time. Memory appears intact and mood is normal.  No gross sensorimotor deficits are present.       DIAGNOSTIC TESTS:  [facility] 11[place]11/21            ASSESSMENT:  (Medical Decision Making)         ICD-10-CM    1. YOLA (obstructive sleep apnea) mild and need further treatment    The pathophysiology of obstructive sleep apnea was reviewed with the patient. It's potential to promote severe neurologic, cardiac, pulmonary, and gastrointestinal problems was discussed. Specifically, the increased incidence of hypertension, coronary artery disease, congestive heart failure, pulmonary hypertension, gastroesophageal reflux, pathologic hypersomnolence, memory loss, and glucose intolerance was related to the consequences of hypoxemia, hypercapnia, airway obstruction, and sympathetic overdrive. We also discussed the ability of nasal CPAP to correct these abnormalities through maintenance of a patent airway. Therapeutic options including surgery, oral appliances, and weight loss were also reviewed. G47.33 DME - DURABLE MEDICAL EQUIPMENT      2. Nocturnal hypoxemia mild and should improve with the CPAP therapy G47.34 DME - DURABLE MEDICAL EQUIPMENT      3. Inadequate sleep hygiene, contributing to his daytime fatigue Z72.821       4. PLMD (periodic limb movement disorder) , no significant arousal and no need for treatment at present G47.61              PLAN:    The patient will be started on auto CPAP trial at 5-10 cm with EPR 3 and humidity    Ramp time will be 15 minutes of starting at 4 cm    Proper sleep hygiene are strongly recommended and discussed with the patient    Positional therapy is also recommended    Proper handout regarding sleep education and sleep hygiene is given to the patient    He would be scheduled for follow-up with the [employer] company for mask fitting and CPAP set up    He will return to the sleep center in 4 months or sooner if needed.   At that time we will determine his compliance and address any problem regarding his therapy    Orders Placed This Encounter   Procedures    DME - [address] [facility],[address] [place] [address] PULMONARY AND CRITICAL [facility]  Phone: Vianca Ascencio [phone]  Dept: [phone]      Patient Name: Jeannette Cook  : 1960  Gender: male  Address: [address] [place] [address]  [phone]      Primary Insurance: Payor: UMR / Plan: UMR  / Product Type: *No Product type* /   Subscriber ID: [id number] - (Commercial)      AMB Supply Order  Order Details     DME Location:   Order Date: 2022   Diagnoses of YOLA (obstructive sleep apnea) and Nocturnal hypoxemia were pertinent to this visit. (  X   )New Set-Up       machine   (     ) CPAP Unit  (    x ) Auto CPAP Unit  (     ) BiLevel Unit  (     ) Auto BiLevel Unit  (     ) ASV         Length of need: 12 months    Pressure: 5-10  cmH20  EPR: 3  Ramp Time:  15 min    Starting Ramp Pressure:  4  cm H20    Patient had a diagnostic Apnea Hypopnea Index (AHI) of :  13.2/hr    *SUPPLIES* Replace all as needed, or per coverage guidelines     Masks Type:      (   ) -Full Face Mask (1 per 3 mon)  (    ) -Full Mask (1 per month) Interface/Cushion      (    ) -Nasal Mask (1 per 3 mon)  (    ) - Nasal Mask (1 per month) Interface/Cushion  (    ) -Pillow (2 per mon)  (    ) -Tglszywar (1 per 6 mon)      _________________________________________________________________          Other Supplies:    (  X   )-Sirxtuot (1 per 6 mon)  ( X    )-Cskjmh Tubing (1 per 3 mon)  (  X   )- Disposable Filter (2 per mon)  (   X  )-Saqfsr Humidifier (1 per year)     (     )-Ajhbltavd (sometimes used with Full Face Mask) (1 per 6 mos)  (     )-Tubing-without heat (1 per 3 mos)  (     )-Non-Disposable Filter (1 per 6 mos)  (     )-Water Chamber (1 per 6 mos)  (     )-Humidifier non-heated (1 per 5 yrs)      Signed Date: 2022  Electronically Signed By: Freddy Turpin MD               No orders of the defined types were placed in this encounter. Over 50% of today's office visit was spent in face to face time reviewing test results, prognosis, importance of compliance, education about disease process, benefits of medications, instructions for management of acute flare-ups, and follow up plans. Total face to face time spent with patient and charting was 45 minutes.     Yari Mullins MD  Electronically signed

## 2022-07-21 ENCOUNTER — OFFICE VISIT (OUTPATIENT)
Dept: SLEEP MEDICINE | Age: 62
End: 2022-07-21
Payer: COMMERCIAL

## 2022-07-21 VITALS
OXYGEN SATURATION: 98 % | RESPIRATION RATE: 14 BRPM | WEIGHT: 210 LBS | HEART RATE: 90 BPM | HEIGHT: 68 IN | BODY MASS INDEX: 31.83 KG/M2 | SYSTOLIC BLOOD PRESSURE: 118 MMHG | DIASTOLIC BLOOD PRESSURE: 68 MMHG | TEMPERATURE: 97.2 F

## 2022-07-21 DIAGNOSIS — Z72.821 INADEQUATE SLEEP HYGIENE: ICD-10-CM

## 2022-07-21 DIAGNOSIS — G47.34 NOCTURNAL HYPOXEMIA: ICD-10-CM

## 2022-07-21 DIAGNOSIS — G47.61 PLMD (PERIODIC LIMB MOVEMENT DISORDER): ICD-10-CM

## 2022-07-21 DIAGNOSIS — G47.33 OSA (OBSTRUCTIVE SLEEP APNEA): ICD-10-CM

## 2022-07-21 PROCEDURE — 99204 OFFICE O/P NEW MOD 45 MIN: CPT | Performed by: INTERNAL MEDICINE

## 2022-07-21 NOTE — PATIENT INSTRUCTIONS
Learning About Sleeping Well  What does sleeping well mean? Sleeping well means getting enough sleep. How much sleep is enough varies among people. The number of hours you sleep is not as important as how you feel when you wake up. If you do not feel refreshed, you probably need more sleep. Another sign of not getting enough sleep is feeling tired during the day. The average total nightly sleep time is 7½ to 8 hours. Healthy adults may need a little more or a little less than this. Why is getting enough sleep important? Getting enough quality sleep is a basic part of good health. When your sleep suffers, your mood and your thoughts can suffer too. You may find yourself feeling more grumpy or stressed. Not getting enough sleep also can lead to serious problems, including injury, accidents, anxiety, and depression. What might cause poor sleeping? Many things can cause sleep problems, including:  Stress. Stress can be caused by fear about a single event, such as giving a speech. Or you may have ongoing stress, such as worry about work or school. Depression, anxiety, and other mental or emotional conditions. Changes in your sleep habits or surroundings. This includes changes that happen where you sleep, such as noise, light, or sleeping in a different bed. It also includes changes in your sleep pattern, such as having jet lag or working a late shift. Health problems, such as pain, breathing problems, and restless legs syndrome. Lack of regular exercise. How can you help yourself? Here are some tips that may help you sleep more soundly and wake up feeling more refreshed. Your sleeping area   Use your bedroom only for sleeping and sex. A bit of light reading may help you fall asleep. But if it doesn't, do your reading elsewhere in the house. Don't watch TV in bed. Be sure your bed is big enough to stretch out comfortably, especially if you have a sleep partner. Keep your bedroom quiet, dark, and cool.  Use curtains, blinds, or a sleep mask to block out light. To block out noise, use earplugs, soothing music, or a \"white noise\" machine. Your evening and bedtime routine   Get regular exercise, but not within 3 to 4 hours before your bedtime. Create a relaxing bedtime routine. You might want to take a warm shower or bath, listen to soothing music, or drink a cup of noncaffeinated tea. Go to bed at the same time every night. And get up at the same time every morning, even if you feel tired. What to avoid   Limit caffeine (coffee, tea, caffeinated sodas) during the day, and don't have any for at least 4 to 6 hours before bedtime. Don't drink alcohol before bedtime. Alcohol can cause you to wake up more often during the night. Don't smoke or use tobacco, especially in the evening. Nicotine can keep you awake. Don't take naps during the day, especially close to bedtime. Don't lie in bed awake for too long. If you can't fall asleep, or if you wake up in the middle of the night and can't get back to sleep within 15 minutes or so, get out of bed and go to another room until you feel sleepy. Don't take medicine right before bed that may keep you awake or make you feel hyper or energized. Your doctor can tell you if your medicine may do this and if you can take it earlier in the day. If you can't sleep   Imagine yourself in a peaceful, pleasant scene. Focus on the details and feelings of being in a place that is relaxing. Get up and do a quiet or boring activity until you feel sleepy. Don't drink any liquids after 6 p.m. if you wake up often because you have to go to the bathroom. Where can you learn more? Go to [employer].be  Enter U456 in the search box to learn more about \"Learning About Sleeping Well. \"   © 0296-0706 Healthwise, Incorporated. Care instructions adapted under license by [address] [employer] (which disclaims liability or warranty for this information).  This care instruction is for use with your licensed healthcare professional. If you have questions about a medical condition or this instruction, always ask your healthcare professional. [address] any warranty or liability for your use of this information. Content Version: 25.8.505307; Current as of: March 12, 2014              Sleep Apnea: After Your Visit  Your Care Instructions  Sleep apnea means that you frequently stop breathing for 10 seconds or longer during sleep. It can be mild to severe, based on the number of times an hour that you stop breathing or have slowed breathing. Blocked or narrowed airways in your nose, mouth, or throat can cause sleep apnea. Your airway can become blocked when your throat muscles and tongue relax during sleep. You can treat sleep apnea at home by making lifestyle changes. You also can use a CPAP breathing machine that keeps tissues in the throat from blocking your airway. Or your doctor may suggest that you use a breathing device while you sleep. It helps keep your airway open. This could be a device that you put in your mouth. Other examples include strips or disks that you use on your nose. In some cases, surgery may be needed to remove enlarged tissues in the throat. Follow-up care is a key part of your treatment and safety. Be sure to make and go to all appointments, and call your doctor if you are having problems. It's also a good idea to know your test results and keep a list of the medicines you take. How can you care for yourself at home? Lose weight, if needed. It may reduce the number of times you stop breathing or have slowed breathing. Sleep on your side. It may stop mild apnea. If you tend to roll onto your back, sew a pocket in the back of your pajama top. Put a tennis ball into the pocket, and stitch the pocket shut. This will help keep you from sleeping on your back. Avoid alcohol and medicines such as sleeping pills and sedatives before bed. Do not smoke. Smoking can make sleep apnea worse. If you need help quitting, talk to your doctor about stop-smoking programs and medicines. These can increase your chances of quitting for good. Prop up the head of your bed 4 to 6 inches by putting bricks under the legs of the bed. Treat breathing problems, such as a stuffy nose, caused by a cold or allergies. Try a continuous positive airway pressure (CPAP) breathing machine if your doctor recommends it. The machine keeps your airway open when you sleep. If CPAP does not work for you, ask your doctor if you can try other breathing machines. A bilevel positive airway pressure machine uses one type of air pressure for breathing in and another type for breathing out. Another device raises or lowers air pressure as needed while you breathe. Talk to your doctor if:  Your nose feels dry or bleeds when you use one of these machines. You may need to increase moisture in the air. A humidifier may help. Your nose is runny or stuffy from using a breathing machine. Decongestants or a corticosteroid nasal spray may help. You are sleepy during the day and it gets in the way of the normal things you do. Do not drive when you are drowsy. When should you call for help? Watch closely for changes in your health, and be sure to contact your doctor if:  You still have sleep apnea even though you have made lifestyle changes. You are thinking of trying a device such as CPAP. You are having problems using a CPAP or similar machine. Where can you learn more? Go to [employer].be  Enter J936 in the search box to learn more about \"Sleep Apnea: After Your Visit. \"   © 8176-2388 Healthwise, Incorporated. Care instructions adapted under license by [employer] (which disclaims liability or warranty for this information).  This care instruction is for use with your licensed healthcare professional. If you have questions about a medical condition or this instruction, always ask your healthcare professional. [address] any warranty or liability for your use of this information. Content Version: 51.6.912145; Current as of: January 14, 2014                        Eating Healthy Foods: After Your Visit  Your Care Instructions  Eating healthy foods can help lower your risk for disease. Healthy food gives you energy and keeps your heart strong, your brain active, your muscles working, and your bones strong. A healthy diet includes a variety of foods from the basic food groups: grains, vegetables, fruits, milk and milk products, and meat and beans. Some people may eat more of their favorite foods from only one food group and, as a result, miss getting the nutrients they need. So, it is important to pay attention not only to what you eat but also to what you are missing from your diet. You can eat a healthy, balanced diet by making a few small changes. Follow-up care is a key part of your treatment and safety. Be sure to make and go to all appointments, and call your doctor if you are having problems. Its also a good idea to know your test results and keep a list of the medicines you take. How can you care for yourself at home? Look at what you eat  Keep a food diary for a week or two and record everything you eat or drink. Track the number of servings you eat from each food group. For a balanced diet every day, eat a variety of:  6 or more ounce-equivalents of grains, such as cereals, breads, crackers, rice, or pasta, every day. An ounce-equivalent is 1 slice of bread, 1 cup of ready-to-eat cereal, or ½ cup of cooked rice, cooked pasta, or cooked cereal.  2½ cups of vegetables, especially:  Dark-green vegetables such as broccoli and spinach. Orange vegetables such as carrots and sweet potatoes. Dry beans (such as ibrahim and kidney beans) and peas (such as lentils). 2 cups of fresh, frozen, or canned fruit.  A small apple or 1 banana or orange equals 1 cup.  3 cups wheat crust or grilled chicken (instead of sausage or pepperoni). Pasta with roasted vegetables, grilled chicken, or marinara sauce instead of cream sauce. A vegetable wrap or grilled chicken wrap. Broiled or poached food instead of fried or breaded items. Make healthy choices easy  Buy packaged, prewashed, ready-to-eat fresh vegetables and fruits, such as baby carrots, salad mixes, and chopped or shredded broccoli and cauliflower. Buy packaged, presliced fruits, such as melon or pineapple. Choose 100% fruit or vegetable juice instead of soda. Limit juice intake to 4 to 6 oz (½ to ¾ cup) a day. Blend low-fat yogurt, fruit juice, and canned or frozen fruit to make a smoothie for breakfast or a snack. Where can you learn more? Go to [employer].be  Enter T756 in the search box to learn more about \"Eating Healthy Foods: After Your Visit. \"   © 8708-9008 Healthwise, Incorporated. Care instructions adapted under license by [facility] (which disclaims liability or warranty for this information). This care instruction is for use with your licensed healthcare professional. If you have questions about a medical condition or this instruction, always ask your healthcare professional. [address] any warranty or liability for your use of this information. Content Version: 10.1.489408; Current as of: May 17, 2013                                    Learning About Physical Activity  What is physical activity? Physical activity is any kind of activity that gets your body moving. Being active means allowing your body to \"practice\" breathing, stretching, and lifting. The more practice your body gets, the better it works. The types of physical activity that can help you get fit and stay healthy include:  Aerobic or \"cardio\" activities that make your heart beat faster and make you breathe harder, such as brisk walking, riding a bike, or running.  Aerobic activities strengthen your heart and lungs and build up your endurance. Strength training activities that make your muscles work against, or \"resist,\" something, such as lifting weights or doing push-ups. These activities help tone and strengthen your muscles. Stretches that allow you to move your joints and muscles through their full range of motion. Stretching helps you be more flexible and avoid injury. What are the benefits of physical activity? Being active is one of the best things you can do to get fit and stay healthy. It helps you to:  Feel stronger and have more energy to do all the things you like to do. Focus better at school or work and perform better in sports. Feel, think, and sleep better. Reach and stay at a healthy weight. Lose fat and build lean muscle. Lower your risk for serious health problems. Keep your bones, muscles, and joints strong. Being fit lets you do more physical activity. And it lets you work out harder without as much effort. How can you make physical activity part of your life? Get at least 30 minutes of exercise on most days of the week. Walking is a good choice. You also may want to do other activities, such as running, swimming, cycling, or playing tennis or team sports. Pick activities that you like--ones that make your heart beat faster, your muscles stronger, and your muscles and joints more flexible. If you find more than one thing you like doing, do them all. You don't have to do the same thing every day. Get your heart pumping every day. Any activity that makes your heart beat faster and keeps it at that rate for a while counts. Here are some great ways to get your heart beating faster:  Go for a brisk walk, run, or bike ride. Go for a hike or swim. Go in-line skating. Play a game of touch football, basketball, or soccer. Ride a bike. Play tennis or racquetball. Climb stairs. Even some household chores can be aerobic--just do them at a faster pace.  Vacuuming, raking or mowing the lawn, sweeping the garage, and washing and waxing the car all can help get your heart rate up. Strengthen your muscles during the week. You don't have to lift heavy weights or grow big, bulky muscles to get stronger. Doing a few simple activities that make your muscles work against, or \"resist,\" something can help you get stronger. For example, you can:  Do push-ups or sit-ups, which use your own body weight as resistance. Lift weights or dumbbells or use stretch bands at home or in a gym or community center. Stretch your muscles often. Stretching will help you as you become more active. It can help you stay flexible, loosen tight muscles, and avoid injury. It can also help improve your balance and posture and can be a great way to relax. Be sure to stretch the muscles you'll be using when you work out. Its best to warm your muscles slightly before you stretch them. Walk or do some other light aerobic activity for a few minutes, and then start stretching. When you stretch your muscles:  Do it slowly. Stretching is not about going fast or making sudden movements. Don't push or bounce during a stretch. Hold each stretch for at least 15 to 30 seconds, if you can. You should feel a stretch in the muscle, but not pain. Breathe out as you do the stretch. Then breathe in as you hold the stretch. Don't hold your breath. If you're worried about how more activity might affect your health, have a checkup before you start. Follow any special advice your doctor gives you for getting a smart start. Where can you learn more? Go to [employer]be  Enter [id number] in the search box to learn more about \"Learning About Physical Activity. \"   © 2741-7197 Healthwise, Incorporated. Care instructions adapted under license by [facility] [employer] (which disclaims liability or warranty for this information).  This care instruction is for use with your licensed healthcare professional. If you have questions about a medical condition or this instruction, always ask your healthcare professional. [address] any warranty or liability for your use of this information. Content Version: 26.8.123707; Current as of: November 15, 2013                                          Learning About CPAP for Sleep Apnea  What is CPAP? CPAP is a small machine that you use at home every night while you sleep. It increases air pressure in your throat to keep your airway open. When you have sleep apnea, this can help you sleep better so you feel much better. CPAP stands for \"continuous positive airway pressure. \"  The CPAP machine will have one of the following:  A mask that covers your nose and mouth  Prongs that fit into your nose  A mask that covers your nose only, the most common type. This type is called NCPAP. The N stands for \"nasal.\"  Why is it done? CPAP is usually the best treatment for obstructive sleep apnea. It is the first treatment choice and the most widely used. Your doctor may suggest CPAP if you have: Moderate to severe sleep apnea. Sleep apnea and coronary artery disease (CAD) or heart failure. How does it help? CPAP can help you have more normal sleep, so you feel less sleepy and more alert during the daytime. CPAP may help keep heart failure or other heart problems from getting worse. CPAP may help lower your blood pressure. If you use CPAP, your bed partner may also sleep better because you are not snoring or restless. What are the side effects? Some people who use CPAP have:  A dry or stuffy nose and a sore throat. Irritated skin on the face. Sore eyes. Bloating. If you have any of these problems, work with your doctor to fix them. Here are some things you can try:  Be sure the mask or nasal prongs fit well. See if your doctor can adjust the pressure of your CPAP. If your nose is dry, try a humidifier.   If your nose is runny or stuffy, try decongestant medicine or a steroid nasal spray. Be safe with medicines. Read and follow all instructions on the label. Do not use the medicine longer than the label says. If these things do not help, you might try a different type of machine. Some machines have air pressure that adjusts on its own. Others have air pressures that are different when you breathe in than when you breathe out. This may reduce discomfort caused by too much pressure in your nose. Where can you learn more? Go to [employer].be  Enter Linus Michaels in the search box to learn more about \"Learning About CPAP for Sleep Apnea. \"   © 3481-7360 Healthwise, Incorporated. Care instructions adapted under license by [employer] (which disclaims liability or warranty for this information). This care instruction is for use with your licensed healthcare professional. If you have questions about a medical condition or this instruction, always ask your healthcare professional. [place] [address] any warranty or liability for your use of this information. Content Version: 24.4.452005; Current as of: January 24, 2014     Sleep Hygiene Instructions    Sleep only as much as you need to feel refreshed during the following day. Restricting your time in bed helps to consolidate and deepen your sleep. Excessively long times in bed lead to fragmented and shallow sleep. Get up at your regular time the next day, no matter how little your slept. Get up at the same time each day, 7 days a week. A regular wake time in the morning leads to regular times on sleep onset, and helps to set your biological clock. Exercise regularly. Schedule exercise times so that they do not occur within 3 hours of when you intend to go to bed. Exercise makes it easier to initiate sleep and deepen sleep. Don't take your problems to bed. Plan some time earlier in the evening for working on your problems or planning the next day's activities.   Worrying may interfere with initiating sleep and produce shallow sleep. Train yourself to use the bedroom only for sleep and sexual activity. This will help condition your brain to see bed as the place for sleeping. Do not read, watch TV or eat in bed. Do not try and fall asleep. This only makes the problem worse. Instead, turn on the light, leave the bedroom, and do something different like reading a book. Don't engage in stimulating activity. Return to bed only when you feel sleepy. Avoid long naps. Staying awake during the day helps to fall asleep at night. Naps totalling more than 30 minutes increase your chances of having trouble sleeping at night. Make sure that your bedroom is comfortable and free from light and noise. A comfortable, noise-free sleep environment will reduce the likelihood that you will wake up during the night. Noise that does not awaken you may disturb the quality of your sleep. Carpeting, insulated curtains, and closing the door may help. Make sure that your bedroom is at a comfortable temperature during the night. Excessively warm or cold sleep environments may disturb sleep. Eat regular meals and di not go to bed hungry. Hunger may disturb sleep. A light snack at bedtime (especially carbohydrates) may help sleep, but avoid greasy or heavy foods. Avoid excessive liquids in the evening. Reducing liquid intake will minimize the need for night-time trips to the bathroom. Cut down on all caffeine products. Caffeinated beverages and food (Coffee, tea, cola, chocolate) can cause difficulty falling asleep, awakenings during the night, and shallow sleep. Even caffeine early in the day can disrupt night-time sleep. Avoid alcohol, especially in the evening. Although alcohol helps tense people fall asleep more easily, it causes awakenings later in the night. Smoking may disturb sleep. Nicotine is a stimulant. Try not to smoke during the night when you have trouble sleeping.      Learning about Sleeping Well    What does sleeping well mean? Sleeping well means getting enough sleep. How much sleep is enough varies among people. The number of hours you sleep is not as improtant as how you feel when you wake up. If you do not feel refreshed, you probably need more sleep. Another sign of not getting enough sleep is feeling tired during the day. The average totally nightly sleep time is 7 1/2 to 8 hours. Healthy adults may need a little more or a little less than this. Why is getting enough sleep important? Getting enough quality sleep is a basic part of good health. When your sleep suffers, your mood and your thoughts can suffer too. Your thoughts can suffer too. You ma find yourself feeling more grumpy or stressed. No getting enough sleep also can lead to serious problems, including injury, accidents, anxiety, and depression. What might cause poor sleeping? Many things can cause sleep problems, including:    Stress. Stress can be caused by fear about a single event, such as giving a speech. Or you may have ongoing stress, such as worry about work or school. Depression, anxiety, and other mental or emotional conditions. Changes in your sleep habits or surroundings. This includes changes that happen where you sleep, such as noise, light, or sleeping in a different bed. It also includes changes in your sleep pattern, such as having jet lab or working a late shift. Health problems, such as pain, breathing problems, and restless legs syndrome. Lack of regular exercise. How can you help yourself? Here are some tips that may help you sleep more soundly and wake up feeling more refreshed. Your sleeping area    Use your bedroom only for sleeping and sex. A bit of light reading may help you fall asleep. But if it doesn't, do your reading elsewhere in the house. Don't watch TV in bed.     Be sure your bed is big enough to stretch out comfortable, especially if you have a sleep partner. Keep your bedroom quiet, dark, and cool. Use curtains, blinds, or sleep mask to block out light. To block out noise, use earplugs, soothing music, or a \"white noise\" machine. Your evening and bedtime routine    Create a relaxing bedtime routine. You might want to take a warm shower or bath, listen to soothing music, or drink a cup of non-caffeinated tea. Go to bed at the same time every night. And get up at the same time every morning, even if you feel tired. What to avoid:    Limit caffeine (coffee, tea, caffeinated sodas) during the day, and dont have any for at least 4 to 6 hours before bedtime. Don't drink alcohol before bedtime. Alcohol can cause you to wake up more often during the night. Don't smoke or use tobacco, especially in the evening. Nicotine can keep you awake. Don't like in bed away for too long. If you can't fall asleep, or if you wake up in the middle of the night and can't get back to sleep within 15 minutes or so, get out of bed and go to another room until you feel sleepy. Don't take medicine right before bed that may keep you awake or make you feel hyper or enegerized. Your doctor can tell you if your medicine may do this and if you can take it earlier in the day. If you can't sleep:    Imagine yourself in a peaceful, pleasant scene. Focus on the details and feelings of being in a place that is relaxing. Get up and do a quiet or boring activity until you feel sleepy. Don't drink liquids after 6 p.m. if you wake up often because you have to go to the bathroom. Where can you learn more? Go to http://www.gray.com/    Enter X794 in the search box to learn more about \"Learning About Sleeping Well. \"

## 2022-10-16 DIAGNOSIS — E11.9 TYPE 2 DIABETES MELLITUS WITHOUT COMPLICATION, WITHOUT LONG-TERM CURRENT USE OF INSULIN (HCC): Primary | ICD-10-CM

## 2022-10-16 DIAGNOSIS — E78.00 PURE HYPERCHOLESTEROLEMIA, UNSPECIFIED: ICD-10-CM

## 2022-10-17 ENCOUNTER — NURSE ONLY (OUTPATIENT)
Dept: FAMILY MEDICINE CLINIC | Facility: CLINIC | Age: 62
End: 2022-10-17

## 2022-10-17 DIAGNOSIS — E11.9 TYPE 2 DIABETES MELLITUS WITHOUT COMPLICATION, WITHOUT LONG-TERM CURRENT USE OF INSULIN (HCC): ICD-10-CM

## 2022-10-17 DIAGNOSIS — E78.00 PURE HYPERCHOLESTEROLEMIA, UNSPECIFIED: ICD-10-CM

## 2022-10-17 LAB
ALBUMIN SERPL-MCNC: 4 G/DL (ref 3.2–4.6)
ALBUMIN/GLOB SERPL: 1.3 {RATIO} (ref 0.4–1.6)
ALP SERPL-CCNC: 74 U/L (ref 50–136)
ALT SERPL-CCNC: 55 U/L (ref 12–65)
ANION GAP SERPL CALC-SCNC: 3 MMOL/L (ref 2–11)
AST SERPL-CCNC: 23 U/L (ref 15–37)
BASOPHILS # BLD: 0 K/UL (ref 0–0.2)
BASOPHILS NFR BLD: 1 % (ref 0–2)
BILIRUB SERPL-MCNC: 0.7 MG/DL (ref 0.2–1.1)
BUN SERPL-MCNC: 13 MG/DL (ref 8–23)
CALCIUM SERPL-MCNC: 9.2 MG/DL (ref 8.3–10.4)
CHLORIDE SERPL-SCNC: 107 MMOL/L (ref 101–110)
CHOLEST SERPL-MCNC: 104 MG/DL
CO2 SERPL-SCNC: 28 MMOL/L (ref 21–32)
CREAT SERPL-MCNC: 0.9 MG/DL (ref 0.8–1.5)
DIFFERENTIAL METHOD BLD: ABNORMAL
EOSINOPHIL # BLD: 0.2 K/UL (ref 0–0.8)
EOSINOPHIL NFR BLD: 3 % (ref 0.5–7.8)
ERYTHROCYTE [DISTWIDTH] IN BLOOD BY AUTOMATED COUNT: 14.6 % (ref 11.9–14.6)
EST. AVERAGE GLUCOSE BLD GHB EST-MCNC: 140 MG/DL
GLOBULIN SER CALC-MCNC: 3.2 G/DL (ref 2.8–4.5)
GLUCOSE SERPL-MCNC: 112 MG/DL (ref 65–100)
HBA1C MFR BLD: 6.5 % (ref 4.8–5.6)
HCT VFR BLD AUTO: 49.9 % (ref 41.1–50.3)
HDLC SERPL-MCNC: 36 MG/DL (ref 40–60)
HDLC SERPL: 2.9 {RATIO}
HGB BLD-MCNC: 16.1 G/DL (ref 13.6–17.2)
IMM GRANULOCYTES # BLD AUTO: 0 K/UL (ref 0–0.5)
IMM GRANULOCYTES NFR BLD AUTO: 0 % (ref 0–5)
LDLC SERPL CALC-MCNC: 58.4 MG/DL
LYMPHOCYTES # BLD: 2.5 K/UL (ref 0.5–4.6)
LYMPHOCYTES NFR BLD: 45 % (ref 13–44)
MCH RBC QN AUTO: 29.8 PG (ref 26.1–32.9)
MCHC RBC AUTO-ENTMCNC: 32.3 G/DL (ref 31.4–35)
MCV RBC AUTO: 92.2 FL (ref 82–102)
MONOCYTES # BLD: 0.6 K/UL (ref 0.1–1.3)
MONOCYTES NFR BLD: 11 % (ref 4–12)
NEUTS SEG # BLD: 2.3 K/UL (ref 1.7–8.2)
NEUTS SEG NFR BLD: 40 % (ref 43–78)
NRBC # BLD: 0 K/UL (ref 0–0.2)
PLATELET # BLD AUTO: 170 K/UL (ref 150–450)
PMV BLD AUTO: 9.8 FL (ref 9.4–12.3)
POTASSIUM SERPL-SCNC: 4.6 MMOL/L (ref 3.5–5.1)
PROT SERPL-MCNC: 7.2 G/DL (ref 6.3–8.2)
RBC # BLD AUTO: 5.41 M/UL (ref 4.23–5.6)
SODIUM SERPL-SCNC: 138 MMOL/L (ref 133–143)
TRIGL SERPL-MCNC: 48 MG/DL (ref 35–150)
VLDLC SERPL CALC-MCNC: 9.6 MG/DL (ref 6–23)
WBC # BLD AUTO: 5.7 K/UL (ref 4.3–11.1)

## 2022-10-24 ENCOUNTER — OFFICE VISIT (OUTPATIENT)
Dept: FAMILY MEDICINE CLINIC | Facility: CLINIC | Age: 62
End: 2022-10-24
Payer: COMMERCIAL

## 2022-10-24 VITALS
HEART RATE: 67 BPM | DIASTOLIC BLOOD PRESSURE: 86 MMHG | OXYGEN SATURATION: 99 % | SYSTOLIC BLOOD PRESSURE: 138 MMHG | BODY MASS INDEX: 32.84 KG/M2 | WEIGHT: 216 LBS

## 2022-10-24 DIAGNOSIS — Z11.4 SCREENING FOR HUMAN IMMUNODEFICIENCY VIRUS: ICD-10-CM

## 2022-10-24 DIAGNOSIS — Z12.5 SPECIAL SCREENING FOR MALIGNANT NEOPLASM OF PROSTATE: ICD-10-CM

## 2022-10-24 DIAGNOSIS — Z23 ENCOUNTER FOR IMMUNIZATION: ICD-10-CM

## 2022-10-24 DIAGNOSIS — M67.441 DIGITAL MUCOUS CYST OF RIGHT HAND: ICD-10-CM

## 2022-10-24 DIAGNOSIS — E78.00 PURE HYPERCHOLESTEROLEMIA, UNSPECIFIED: ICD-10-CM

## 2022-10-24 DIAGNOSIS — E11.9 TYPE 2 DIABETES MELLITUS WITHOUT COMPLICATION, WITHOUT LONG-TERM CURRENT USE OF INSULIN (HCC): Primary | ICD-10-CM

## 2022-10-24 DIAGNOSIS — Z11.59 NEED FOR HEPATITIS C SCREENING TEST: ICD-10-CM

## 2022-10-24 DIAGNOSIS — E55.9 VITAMIN D DEFICIENCY, UNSPECIFIED: ICD-10-CM

## 2022-10-24 PROBLEM — R35.0 URINARY FREQUENCY: Status: ACTIVE | Noted: 2018-08-08

## 2022-10-24 PROCEDURE — 3044F HG A1C LEVEL LT 7.0%: CPT | Performed by: NURSE PRACTITIONER

## 2022-10-24 PROCEDURE — 99214 OFFICE O/P EST MOD 30 MIN: CPT | Performed by: NURSE PRACTITIONER

## 2022-10-24 PROCEDURE — 90471 IMMUNIZATION ADMIN: CPT | Performed by: NURSE PRACTITIONER

## 2022-10-24 PROCEDURE — 90674 CCIIV4 VAC NO PRSV 0.5 ML IM: CPT | Performed by: NURSE PRACTITIONER

## 2022-10-24 RX ORDER — SIMVASTATIN 40 MG
40 TABLET ORAL NIGHTLY
Qty: 90 TABLET | Refills: 3 | Status: SHIPPED | OUTPATIENT
Start: 2022-10-24

## 2022-10-24 RX ORDER — GLIMEPIRIDE 4 MG/1
4 TABLET ORAL
Qty: 90 TABLET | Refills: 3 | Status: SHIPPED | OUTPATIENT
Start: 2022-10-24

## 2022-10-24 RX ORDER — NAPROXEN 500 MG/1
TABLET ORAL
Qty: 180 TABLET | Refills: 3 | Status: SHIPPED | OUTPATIENT
Start: 2022-10-24

## 2022-10-24 RX ORDER — EZETIMIBE 10 MG/1
10 TABLET ORAL DAILY
Qty: 90 TABLET | Refills: 3 | Status: SHIPPED | OUTPATIENT
Start: 2022-10-24

## 2022-10-24 RX ORDER — TIZANIDINE 4 MG/1
TABLET ORAL
Qty: 30 TABLET | Refills: 2 | Status: SHIPPED | OUTPATIENT
Start: 2022-10-24

## 2022-10-24 RX ORDER — LISINOPRIL 2.5 MG/1
2.5 TABLET ORAL DAILY
Qty: 90 TABLET | Refills: 3 | Status: SHIPPED | OUTPATIENT
Start: 2022-10-24

## 2022-10-24 ASSESSMENT — ENCOUNTER SYMPTOMS
COLOR CHANGE: 0
APNEA: 0
ABDOMINAL DISTENTION: 0
BACK PAIN: 0
SORE THROAT: 0
WHEEZING: 0
EYE PAIN: 0
FACIAL SWELLING: 0
ANAL BLEEDING: 0
EYE DISCHARGE: 0
EYES NEGATIVE: 1
CHEST TIGHTNESS: 0
COUGH: 0
SINUS PAIN: 0
BLOOD IN STOOL: 0
GASTROINTESTINAL NEGATIVE: 1
SHORTNESS OF BREATH: 0
NAUSEA: 0
TROUBLE SWALLOWING: 0
EYE REDNESS: 0
VOICE CHANGE: 0
CHOKING: 0
RESPIRATORY NEGATIVE: 1
EYE ITCHING: 0
ABDOMINAL PAIN: 0
STRIDOR: 0
DIARRHEA: 0
PHOTOPHOBIA: 0
SINUS PRESSURE: 0
CONSTIPATION: 0
ALLERGIC/IMMUNOLOGIC NEGATIVE: 1
VOMITING: 0
RECTAL PAIN: 0
RHINORRHEA: 0

## 2022-10-24 ASSESSMENT — ANXIETY QUESTIONNAIRES
4. TROUBLE RELAXING: 0
3. WORRYING TOO MUCH ABOUT DIFFERENT THINGS: 0
5. BEING SO RESTLESS THAT IT IS HARD TO SIT STILL: 0
1. FEELING NERVOUS, ANXIOUS, OR ON EDGE: 0
GAD7 TOTAL SCORE: 0
IF YOU CHECKED OFF ANY PROBLEMS ON THIS QUESTIONNAIRE, HOW DIFFICULT HAVE THESE PROBLEMS MADE IT FOR YOU TO DO YOUR WORK, TAKE CARE OF THINGS AT HOME, OR GET ALONG WITH OTHER PEOPLE: NOT DIFFICULT AT ALL
7. FEELING AFRAID AS IF SOMETHING AWFUL MIGHT HAPPEN: 0
2. NOT BEING ABLE TO STOP OR CONTROL WORRYING: 0
6. BECOMING EASILY ANNOYED OR IRRITABLE: 0

## 2022-10-24 ASSESSMENT — PATIENT HEALTH QUESTIONNAIRE - PHQ9
SUM OF ALL RESPONSES TO PHQ QUESTIONS 1-9: 0
2. FEELING DOWN, DEPRESSED OR HOPELESS: 0
1. LITTLE INTEREST OR PLEASURE IN DOING THINGS: 0
SUM OF ALL RESPONSES TO PHQ QUESTIONS 1-9: 0
SUM OF ALL RESPONSES TO PHQ9 QUESTIONS 1 & 2: 0

## 2022-10-24 NOTE — PROGRESS NOTES
PROGRESS NOTE    Chief Complaint   Patient presents with    Follow-up     Presenting for follow up to discuss labs, DM2 and HLD. Currently taking Zetia. Glimeride and simvastatin. Pt concerned about his right hand, would like referral.        SUBJECTIVE:     Lebron Johnson is a very pleasant  58 y.o. male with hx of diabetes and hyperlipidemia, seen today in office for lab results review. Pt also has complaints of right hand       Past Medical History, Past Surgical History, Family history, Social History, and Medications were all reviewed with the patient today and updated as necessary. Current Outpatient Medications   Medication Sig Dispense Refill    empagliflozin (JARDIANCE) 25 MG tablet Take 1 tablet by mouth daily In the morning for diabetes 90 tablet 3    ezetimibe (ZETIA) 10 MG tablet Take 1 tablet by mouth daily For cholesterol 90 tablet 3    glimepiride (AMARYL) 4 MG tablet Take 1 tablet by mouth daily (with breakfast) For diabetes 90 tablet 3    naproxen (NAPROSYN) 500 MG tablet TAKE 1 TABLET BY MOUTH TWICE A DAY WITH MEALS as needed for pain 180 tablet 3    simvastatin (ZOCOR) 40 MG tablet Take 1 tablet by mouth nightly For cholesterol 90 tablet 3    lisinopril (PRINIVIL;ZESTRIL) 2.5 MG tablet Take 1 tablet by mouth daily For kidney protection 90 tablet 3    tiZANidine (ZANAFLEX) 4 MG tablet Take 1/2 to 1 tablet orally at bedtime as needed for muscle spasm. Cause drowsiness and dizziness 30 tablet 2    Lancets MISC Take BS as directed BID. E11.9      fluticasone (FLONASE) 50 MCG/ACT nasal spray 2 sprays by Nasal route daily (Patient not taking: Reported on 10/24/2022)       No current facility-administered medications for this visit.      No Known Allergies  Patient Active Problem List   Diagnosis    Prediabetes    Hypercholesterolemia    Type 2 diabetes mellitus without complication, without long-term current use of insulin (HCC)    YOLA (obstructive sleep apnea)    Nocturnal hypoxemia Inadequate sleep hygiene    PLMD (periodic limb movement disorder)    Urinary frequency     Past Medical History:   Diagnosis Date    H/O appendicitis     H/O hernia repair     History of cellulitis     Hyperlipidemia     managed with meds    Prediabetes     Type 2 diabetes mellitus without complication, without long-term current use of insulin ([address]      type ll, does not chk glucose (borderline) last A1C 6.6    Visit for dental examination 2017     Past Surgical History:   Procedure Laterality Date    APPENDECTOMY  1984    COLONOSCOPY      COLONOSCOPY N/A 10/9/2020    COLONOSCOPY/ BMI 32 performed by Daren Lugo MD at [facility] ENDOSCOPY    COLONOSCOPY FLX DX W/COLLJ SPEC WHEN PFRMD  10/9/2020         [place]IA REPAIR  [place]    [place]    SHOULDER ARTHROSCOPY Right 2019     Family History   Problem Relation Age of Onset    Cancer Mother     Cancer Sister         breast    No Known Problems Father      Social History     Tobacco Use    Smoking status: Former     Packs/day: 0.25     Types: Cigarettes     Quit date: 2013     Years since quittin.3    Smokeless tobacco: Never   Substance Use Topics    Alcohol use: No         REVIEW OF SYSTEM    Review of Systems   Constitutional: Negative. Negative for activity change, appetite change, chills, diaphoresis, fatigue, fever and unexpected weight change. HENT: Negative. Negative for congestion, dental problem, drooling, ear discharge, ear pain, facial swelling, hearing loss, mouth sores, nosebleeds, postnasal drip, rhinorrhea, sinus pressure, sinus pain, sneezing, sore throat, tinnitus, trouble swallowing and voice change. Eyes: Negative. Negative for photophobia, pain, discharge, redness, itching and visual disturbance. Respiratory: Negative. Negative for apnea, cough, choking, chest tightness, shortness of breath, wheezing and stridor. Cardiovascular: Negative. Negative for chest pain, palpitations and leg swelling. Gastrointestinal: Negative. Negative for abdominal distention, abdominal pain, anal bleeding, blood in stool, constipation, diarrhea, nausea, rectal pain and vomiting. Endocrine: Negative. Negative for cold intolerance, heat intolerance, polydipsia, polyphagia and polyuria. Genitourinary: Negative. Negative for decreased urine volume, difficulty urinating, dysuria, enuresis, flank pain, frequency, genital sores, hematuria and urgency. Musculoskeletal:  Positive for arthralgias. Negative for back pain, gait problem, joint swelling, myalgias, neck pain and neck stiffness. Skin: Negative. Negative for color change, pallor, rash and wound. Allergic/Immunologic: Negative. Negative for environmental allergies, food allergies and immunocompromised state. Neurological: Negative. Negative for dizziness, tremors, seizures, syncope, facial asymmetry, speech difficulty, weakness, light-headedness, numbness and headaches. Hematological: Negative. Negative for adenopathy. Does not bruise/bleed easily. Psychiatric/Behavioral: Negative. Negative for agitation, behavioral problems, confusion, decreased concentration, dysphoric mood, hallucinations, self-injury, sleep disturbance and suicidal ideas. The patient is not nervous/anxious and is not hyperactive. OBJECTIVE:  /86 (Site: Left Upper Arm, Position: Sitting)   Pulse 67   Wt 216 lb (98 kg)   SpO2 99%   BMI 32.84 kg/m²      Physical Exam  Constitutional:       General: He is not in acute distress. Appearance: Normal appearance. He is not ill-appearing, toxic-appearing or diaphoretic. HENT:      Head: Normocephalic and atraumatic. Right Ear: Tympanic membrane, ear canal and external ear normal. There is no impacted cerumen. Left Ear: Tympanic membrane, ear canal and external ear normal. There is no impacted cerumen. Nose: Nose normal.      Mouth/Throat:      Mouth: Mucous membranes are moist.      Pharynx: Oropharynx is clear.    Eyes: Extraocular Movements: Extraocular movements intact. Conjunctiva/sclera: Conjunctivae normal.   Neck:      Vascular: No carotid bruit. Cardiovascular:      Rate and Rhythm: Normal rate and regular rhythm. Pulses: Normal pulses. Heart sounds: Normal heart sounds. Pulmonary:      Effort: Pulmonary effort is normal.      Breath sounds: Normal breath sounds. Abdominal:      General: Abdomen is flat. Bowel sounds are normal.      Palpations: Abdomen is soft. Musculoskeletal:      Cervical back: Normal range of motion and neck supple. No rigidity or tenderness. Lymphadenopathy:      Cervical: No cervical adenopathy. Skin:     General: Skin is warm and dry. Comments: Right palmar surface of hand with small moveable cystlike lesion approx 0.6 mm in diameter. Slightly tender to touch. No erythema, no open wounds or lesions, no active drainage or discharge. Neurological:      General: No focal deficit present. Mental Status: He is alert and oriented to person, place, and time. Psychiatric:         Mood and Affect: Mood normal.         Behavior: Behavior normal.         Thought Content: Thought content normal.         Judgment: Judgment normal.         Medical problems and test results were reviewed with the patient today.    Lab Results   Component Value Date     10/17/2022    K 4.6 10/17/2022     10/17/2022    CO2 28 10/17/2022    BUN 13 10/17/2022    CREATININE 0.90 10/17/2022    GLUCOSE 112 (H) 10/17/2022    CALCIUM 9.2 10/17/2022    PROT 7.2 10/17/2022    LABALBU 4.0 10/17/2022    BILITOT 0.7 10/17/2022    ALKPHOS 74 10/17/2022    AST 23 10/17/2022    ALT 55 10/17/2022    LABGLOM >60 10/17/2022    GFRAA 108 10/07/2021    AGRATIO 1.8 04/14/2022    GLOB 3.2 10/17/2022       Lab Results   Component Value Date    WBC 5.7 10/17/2022    HGB 16.1 10/17/2022    HCT 49.9 10/17/2022    MCV 92.2 10/17/2022     10/17/2022     Hemoglobin A1C   Date Value Ref Range Status 10/17/2022 6.5 (H) 4.8 - 5.6 % Final     Lab Results   Component Value Date    CHOL 104 10/17/2022    CHOL 109 04/14/2022    CHOL 111 10/07/2021     Lab Results   Component Value Date    TRIG 48 10/17/2022    TRIG 58 04/14/2022    TRIG 80 10/07/2021     Lab Results   Component Value Date    HDL 36 (L) 10/17/2022    HDL 33 (L) 04/14/2022    HDL 33 (L) 10/07/2021     Lab Results   Component Value Date    LDLCALC 58.4 10/17/2022    LDLCALC 63 04/14/2022    LDLCALC 62 10/07/2021     Lab Results   Component Value Date    LABVLDL 9.6 10/17/2022    LABVLDL 15 01/10/2020    LABVLDL 14 06/04/2019    VLDL 13 04/14/2022    VLDL 16 10/07/2021    VLDL 15 04/05/2021     Lab Results   Component Value Date    CHOLHDLRATIO 2.9 10/17/2022        ASSESSMENT and PLAN    1. Type 2 diabetes mellitus without complication, without long-term current use of insulin (HCC)  -     empagliflozin (JARDIANCE) 25 MG tablet; Take 1 tablet by mouth daily In the morning for diabetes, Disp-90 tablet, R-3Normal  -     glimepiride (AMARYL) 4 MG tablet; Take 1 tablet by mouth daily (with breakfast) For diabetes, Disp-90 tablet, R-3Normal  -     lisinopril (PRINIVIL;ZESTRIL) 2.5 MG tablet; Take 1 tablet by mouth daily For kidney protection, Disp-90 tablet, R-3Normal  -     AMB POC URINALYSIS DIP STICK MANUAL W/O MICRO; Future  -     Lipid Panel; Future  -     Comprehensive Metabolic Panel; Future  -     CBC with Auto Differential; Future  -     TSH with Reflex; Future  -     Hemoglobin A1C; Future  -     AMB POC URINE, MICROALBUMIN; Future    A1c has improved and is now down to 6.5. I have congratulated patient for excellent job with continuing maintaining his well-controlled diabetes. We will have patient continue Jardiance, glimepiride. We will add lisinopril 2.5 mg daily for nephro protection. We will repeat labs in 6 months. Of note, patient reports [place] [facility] about 2 maybe 3 times a year.   Next appointment in January. Advised patient to obtain a copy of notes for record update. 2. Pure hypercholesterolemia, unspecified  -     ezetimibe (ZETIA) 10 MG tablet; Take 1 tablet by mouth daily For cholesterol, Disp-90 tablet, R-3Normal  -     simvastatin (ZOCOR) 40 MG tablet; Take 1 tablet by mouth nightly For cholesterol, Disp-90 tablet, R-3Normal  -     Lipid Panel; Future  -     Comprehensive Metabolic Panel; Future    Lipid panel stable. LDL at goal of 58.4. We will have patient continue with Zetia and simvastatin. Repeat fasting labs in 6 months. 3. Digital mucous cyst of right hand  -     [address] [place] [address], [place]    Patient with a cyst to palmar surface of right hand that has been present for many years. He reports increasing in size and causing pain and discomfort. He would like to have a referral placed to hand specialist for extraction if possible. Orthopedic hand specialist referral placed for consideration. 4. Vitamin D deficiency, unspecified  -     Vitamin D 25 Hydroxy; Future    Stable. Continue current therapy. Repeat labs in 6 months. 5. Encounter for immunization  -     Influenza, FLUCELVAX, (age 10 mo+), IM, PF, 0.5 mL    Patient is due for annual flu vaccine. Discussed current CDC recommendation for immunization. Pt is amenable to receiving vaccine today in office. VIS provided. Vaccine administered. 6. Special screening for malignant neoplasm of prostate  -     PSA Screening; Future    7. Need for hepatitis C screening test  -     Hepatitis C Antibody; Future    8. Screening for human immunodeficiency virus  -     HIV 1/2 Ag/Ab, 4TH Generation,W Rflx Confirm;  Future      Orders Placed This Encounter   Procedures    Influenza, FLUCELVAX, (age 10 mo+), IM, PF, 0.5 mL    PSA Screening     Standing Status:   Future     Standing Expiration Date:   10/24/2023    Hepatitis C Antibody     Standing Status:   Future     Standing Expiration Date: 10/24/2023    Vitamin D 25 Hydroxy     Standing Status:   Future     Standing Expiration Date:   10/24/2023    Lipid Panel     Standing Status:   Future     Standing Expiration Date:   10/24/2023    Comprehensive Metabolic Panel     Standing Status:   Future     Standing Expiration Date:   10/24/2023    HIV 1/2 Ag/Ab, 4TH Generation,W Rflx Confirm     Standing Status:   Future     Standing Expiration Date:   10/24/2023    CBC with Auto Differential     Standing Status:   Future     Standing Expiration Date:   10/24/2023    TSH with Reflex     Standing Status:   Future     Standing Expiration Date:   10/24/2023    Hemoglobin A1C     Standing Status:   Future     Standing Expiration Date:   10/24/2023    [address], [employer]     Referral Priority:   Routine     Referral Type:   Eval and Treat     Referral Reason:   Specialty Services Required     Requested Specialty:   Orthopedic Surgery     Number of Visits Requested:   1    AMB POC URINALYSIS DIP STICK MANUAL W/O MICRO     Standing Status:   Future     Standing Expiration Date:   10/24/2023    AMB POC URINE, MICROALBUMIN     Standing Status:   Future     Standing Expiration Date:   10/24/2023         Elements of this note have been dictated using speech recognition software. As a result, errors of speech recognition may have occurred. On this date 10/24/2022 I have spent 30 minutes reviewing previous notes, test results and face to face with the patient discussing the diagnosis and importance of compliance with the treatment plan as well as documenting on the day of the visit. Greater than 50% of this visit was spent counseling the patient about test results, prognosis, importance of compliance, education about disease process, benefits of medications, instructions for management of acute symptoms, and follow up plans.     Return in about 6 months (around 4/24/2023) for Physical with fasting labs prior (A1c, cmp, lipids, psa, cbc, tsh, vitd, ua, um).      Leena Snell, APRN - CNP

## 2022-11-04 ENCOUNTER — OFFICE VISIT (OUTPATIENT)
Dept: ORTHOPEDIC SURGERY | Age: 62
End: 2022-11-04
Payer: COMMERCIAL

## 2022-11-04 DIAGNOSIS — M72.0 DUPUYTREN'S CONTRACTURE OF RIGHT HAND: Primary | ICD-10-CM

## 2022-11-04 DIAGNOSIS — M65.341 TRIGGER FINGER, RIGHT RING FINGER: ICD-10-CM

## 2022-11-04 DIAGNOSIS — M65.331 TRIGGER FINGER, RIGHT MIDDLE FINGER: ICD-10-CM

## 2022-11-04 PROCEDURE — 99204 OFFICE O/P NEW MOD 45 MIN: CPT | Performed by: ORTHOPAEDIC SURGERY

## 2022-11-04 PROCEDURE — 20550 NJX 1 TENDON SHEATH/LIGAMENT: CPT | Performed by: ORTHOPAEDIC SURGERY

## 2022-11-04 RX ORDER — BETAMETHASONE SODIUM PHOSPHATE AND BETAMETHASONE ACETATE 3; 3 MG/ML; MG/ML
6 INJECTION, SUSPENSION INTRA-ARTICULAR; INTRALESIONAL; INTRAMUSCULAR; SOFT TISSUE ONCE
Status: COMPLETED | OUTPATIENT
Start: 2022-11-04 | End: 2022-11-04

## 2022-11-04 RX ORDER — MELOXICAM 15 MG/1
15 TABLET ORAL DAILY
Qty: 30 TABLET | Refills: 1 | Status: SHIPPED | OUTPATIENT
Start: 2022-11-04 | End: 2022-12-04

## 2022-11-04 RX ADMIN — BETAMETHASONE SODIUM PHOSPHATE AND BETAMETHASONE ACETATE 6 MG: 3; 3 INJECTION, SUSPENSION INTRA-ARTICULAR; INTRALESIONAL; INTRAMUSCULAR; SOFT TISSUE at 10:14

## 2022-11-08 PROBLEM — M65.341 TRIGGER FINGER, RIGHT RING FINGER: Status: ACTIVE | Noted: 2022-11-08

## 2022-11-08 PROBLEM — M65.331 TRIGGER FINGER, RIGHT MIDDLE FINGER: Status: ACTIVE | Noted: 2022-11-08

## 2022-11-08 PROBLEM — M72.0 DUPUYTREN'S CONTRACTURE OF RIGHT HAND: Status: ACTIVE | Noted: 2022-11-08

## 2022-11-08 NOTE — PROGRESS NOTES
Orthopaedic Hand Surgery Note    Name: Maggie Deutsch  YOB: 1960  Gender: male  MRN: [id number]    CC: New patient referred for hand numbness    HPI: Patient is a 58 y.o. male right hand dominant with a chief complaint of right middle and ring finger clicking, locking and pain. The symptoms have been going on for several weeks, he is also complaining of a mass on the palm just proximal to the ring finger, he believes this is grown over the past few months. The current symptoms are rated a 7/10 and interfere with ADLs. ROS/Meds/PSH/PMH/FH/SH: I personally reviewed the patients standard intake form. Pertinents are discussed in the HPI    Physical Examination:  General: Awake and alert. HEENT: Normocephalic, atraumatic  CV/Pulm: Breathing even and unlabored  Skin: No obvious rashes noted. Lymphatic: No obvious evidence of lymphedema or lymphadenopathy    Musculoskeletal:   Examination on the right demonstrates Normal sensation to light touch in the median distribution, normal sensation in ulnar and radial distribution, Negative carpal tunnel compression testing and Phalen testing. Positive tenderness of the right middle and ring A1 pulley with palpable clicking and Positive  locking. The extensor tendons all track well over the MCP joints. Palpable Dupuytren nodule at the right ring finger track proximal to the A1 pulley, no contracture    Imaging / Electrodiagnostic Tests:     right Hand XR: AP, Lateral, Oblique and Thumb CMC joint     Clinical Indication:  1. Dupuytren's contracture of right hand    2. Trigger finger, right middle finger    3.  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 Dupuytren's contracture of right hand    2. Trigger finger, right middle finger    3. Trigger finger, right ring finger        Plan:  We discussed the diagnosis and different treatment options. We discussed observation, splinting, cortisone injections and surgical release of the A1 pulley. We discussed that stenosing tenosynovitis at the level of the A1 pulley AKA trigger finger is a chronic condition regardless of how long the symptoms have been present, this most likely has been progressing for much longer than the symptoms were evident and it will likely persist for a long time without medical treatment. Furthermore, the vast majority of patients require surgical release at some point despite some short-term benefits of conservative treatment. After discussing in detail the patient elects to proceed with right middle and ring finger A1 pulley steroid injection, Mobic 15 daily for 4 weeks, I will reassess in 2 months. Procedure Note    The risk, benefits and alternatives of injection and no injection therapy were discussed. The patient consented for an injection. The patient has been identified by name and birthdate. The injection site was identified, marked and prepped with a alcohol swab. Time out completed. The A1 pulley of the Right middle and ring finger was/were injected with a 25 gauge needle with 1ml of 6mg/ml Betamethasone and 1ml xylocaine plain 1%. The injection site was then dressed with a bandaid. The patient tolerated the injection well. The patient was instructed to monitor their blood sugars if diabetic and call if any concerns. The patient was instructed to call the office if any adverse local effects occurred or any if any questions or concerns arise. Patient voiced accordance and understanding of the information provided and the formulated plan.  All questions were answered to the patient's satisfaction during the encounter.     Kelly Hays MD  Orthopaedic Surgery  11/08/22  8:22 AM

## 2023-01-10 ENCOUNTER — OFFICE VISIT (OUTPATIENT)
Dept: ORTHOPEDIC SURGERY | Age: 63
End: 2023-01-10

## 2023-01-10 DIAGNOSIS — M72.0 DUPUYTREN'S CONTRACTURE OF RIGHT HAND: ICD-10-CM

## 2023-01-10 DIAGNOSIS — G56.01 RIGHT CARPAL TUNNEL SYNDROME: Primary | ICD-10-CM

## 2023-01-10 DIAGNOSIS — M65.341 TRIGGER FINGER, RIGHT RING FINGER: ICD-10-CM

## 2023-01-10 DIAGNOSIS — M65.331 TRIGGER FINGER, RIGHT MIDDLE FINGER: ICD-10-CM

## 2023-01-10 RX ORDER — BETAMETHASONE SODIUM PHOSPHATE AND BETAMETHASONE ACETATE 3; 3 MG/ML; MG/ML
6 INJECTION, SUSPENSION INTRA-ARTICULAR; INTRALESIONAL; INTRAMUSCULAR; SOFT TISSUE ONCE
Status: COMPLETED | OUTPATIENT
Start: 2023-01-10 | End: 2023-01-10

## 2023-01-10 RX ADMIN — BETAMETHASONE SODIUM PHOSPHATE AND BETAMETHASONE ACETATE 6 MG: 3; 3 INJECTION, SUSPENSION INTRA-ARTICULAR; INTRALESIONAL; INTRAMUSCULAR; SOFT TISSUE at 09:12

## 2023-01-11 NOTE — PROGRESS NOTES
Orthopaedic Hand Surgery Note    Name: María Mejía  YOB: 1960  Gender: male  MRN: [id number]    CC: Follow up for trigger finger    HPI: Patient is a 58 y.o. male who returns today for reevaluation of a right ring and middle trigger finger. Last visit we provided steroid injections, the injection provided great relief, fingers are not clicking, catching or locking, he is complaining of numbness and paresthesias mostly in the mornings and at night. ROS/Meds/PSH/PMH/FH/SH: I personally reviewed the patients standard intake form. Pertinents are discussed in the HPI    Physical Examination:  General: Awake and alert. HEENT: Normocephalic, atraumatic  CV/Pulm: Breathing even and unlabored  Skin: No obvious rashes noted. Lymphatic: No obvious evidence of lymphedema or lymphadenopathy    Musculoskeletal:   Examination on the right upper extremity demonstrates, Decreased sensation and good cap refill in median distribution, positive carpal tunnel compression test, very mild tenderness over the middle and ring A1 pulley with palpable clicking and Negative  locking. Imaging / Electrodiagnostic Tests:     none    Assessment:   1. Right carpal tunnel syndrome    2. Trigger finger, right middle finger    3. Trigger finger, right ring finger    4. Dupuytren's contracture of right hand        Plan:  We discussed the diagnosis and different treatment options. We discussed observation, splinting, cortisone injections and surgical release of the A1 pulley. We discussed that stenosing tenosynovitis at the level of the A1 pulley AKA trigger finger is a chronic condition regardless of how long the symptoms have been present, this most likely has been progressing for much longer than the symptoms were evident and it will likely persist for a long time without medical treatment.  Furthermore, the vast majority of patients require surgical release at some point despite some short-term benefits of conservative treatment. After discussing in detail the patient elects to proceed with Mobic 15 daily for 4 weeks, I performed a right carpal tunnel steroid injection today, this will serve both as a diagnostic and therapeutic purpose, we will obtain a nerve conduction study to assess for carpal tunnel syndrome, I will reassess the patient in 2 months, at that point we will reevaluate the trigger fingers, at this point they are only mild, we will also reevaluate carpal tunnel syndrome at that point and review the nerve conduction study, at that point we will also have more information about how much relief the injection provided and decide further treatment. Procedure Note    The risk, benefits and alternatives of injection and no injection therapy were discussed. The patient consented for an injection. The patient has been identified by name and birthdate. The injection site was identified, marked and prepped with alcohol swabs. Time out completed. The Right carpal tunnel was injected with 1ml of 6mg/ml Betamethasone and 1ml Lidocaine plain 1%. The injection site was then dressed with a bandaid. The patient tolerated the injection well. The patient was instructed to monitor their blood sugars if diabetic and call if any concerns. The patient was instructed to call the office if any adverse local effects occurred or any if any questions or concerns arise. Patient voiced accordance and understanding of the information provided and the formulated plan. All questions were answered to the patient's satisfaction during the encounter.     Phillip Mcleod MD  Orthopaedic Surgery  01/11/23  9:02 AM

## 2023-01-26 RX ORDER — TIZANIDINE 4 MG/1
TABLET ORAL
Qty: 90 TABLET | OUTPATIENT
Start: 2023-01-26

## 2023-01-30 NOTE — PROGRESS NOTES
[address] [place] [address] Court, [address] [facility]  [phone]    Patient Name:  Jonathan Mancilla  YOB: 1960      Office Visit 2/1/2023    CHIEF COMPLAINT:    Chief Complaint   Patient presents with    Sleep Apnea         HISTORY OF PRESENT ILLNESS:  Patient is a 57 yo male seen today for follow up of YOLA. Diagnostic sleep study on 11/11/2021 with an AHI of 13.2 and lowest desaturation of 80%. He is prescribed CPAP therapy at a setting of 5-10 cm H2O with a full facemask. He did try using CPAP for approximately 2-3 weeks initially but states that it was causing him to have headaches and sinus congestion in the mornings with runny nose. States that it was disturbing his sleep and his wife sleep so he has not used CPAP since. Note that when he did use CPAP his AHI was controlled at 3.1 but he states he did not really feel any different as far as daytime sleepiness. He is interested in exploring other possible treatment options but is not totally against making some adjustments of the CPAP pressures and trying CPAP again. We did discuss dentistry as a possible option for treating his sleep apnea. He denies being excessively fatigued or sleepy during the day. Liberty score is 4/24. He denies any major medical changes over the last 6 months. His blood pressure is controlled today.           Sleep Medicine 2/1/2023 10/14/2021   Sitting and reading 1 0   Watching TV 1 2   Sitting, inactive in a public place (e.g. a theatre or a meeting) 1 2   As a passenger in a car for an hour without a break 0 0   Lying down to rest in the afternoon when circumstances permit 0 0   Sitting and talking to someone 0 0   Sitting quietly after a lunch without alcohol 1 2   In a car, while stopped for a few minutes in traffic 0 0   Liberty Sleepiness Score 4 6             Past Medical History:   Diagnosis Date    H/O appendicitis     H/O hernia repair     History of cellulitis Hyperlipidemia     managed with meds    Prediabetes     Type 2 diabetes mellitus without complication, without long-term current use of insulin (HCC)      type ll, does not chk glucose (borderline) last A1C 6.6    Visit for dental examination 2017         Patient Active Problem List   Diagnosis    Prediabetes    Hypercholesterolemia    Type 2 diabetes mellitus without complication, without long-term current use of insulin (HCC)    YOLA (obstructive sleep apnea)    Nocturnal hypoxemia    Inadequate sleep hygiene    PLMD (periodic limb movement disorder)    Urinary frequency    Dupuytren's contracture of right hand    Trigger finger, right middle finger    Trigger finger, right ring finger          Past Surgical History:   Procedure Laterality Date    APPENDECTOMY      COLONOSCOPY      COLONOSCOPY N/A 10/9/2020    COLONOSCOPY/ BMI 32 performed by Daren Lugo MD at [facility] ENDOSCOPY    COLONOSCOPY FLX DX W/COLLJ SPEC WHEN PFRMD  10/9/2020         HERNIA REPAIR  2004    [facility]    SHOULDER ARTHROSCOPY Right 2019           Social History     Socioeconomic History    Marital status:      Spouse name: Not on file    Number of children: Not on file    Years of education: Not on file    Highest education level: Not on file   Occupational History    Not on file   Tobacco Use    Smoking status: Former     Packs/day: 0.25     Types: Cigarettes     Quit date: 2013     Years since quittin.6    Smokeless tobacco: Never   Substance and Sexual Activity    Alcohol use: No    Drug use: No    Sexual activity: Not on file   Other Topics Concern    Not on file   Social History Narrative    Not on file     Social Determinants of Health     Financial Resource Strain: Not on file   Food Insecurity: Not on file   Transportation Needs: Not on file   Physical Activity: Not on file   Stress: Not on file   Social Connections: Not on file   Intimate Partner Violence: Not on file   Housing Stability: Not on file Family History   Problem Relation Age of Onset    Cancer Mother     Cancer Sister         breast    No Known Problems Father          No Known Allergies      Current Outpatient Medications   Medication Sig    empagliflozin (JARDIANCE) 25 MG tablet Take 1 tablet by mouth daily In the morning for diabetes    ezetimibe (ZETIA) 10 MG tablet Take 1 tablet by mouth daily For cholesterol    glimepiride (AMARYL) 4 MG tablet Take 1 tablet by mouth daily (with breakfast) For diabetes    naproxen (NAPROSYN) 500 MG tablet TAKE 1 TABLET BY MOUTH TWICE A DAY WITH MEALS as needed for pain    simvastatin (ZOCOR) 40 MG tablet Take 1 tablet by mouth nightly For cholesterol    lisinopril (PRINIVIL;ZESTRIL) 2.5 MG tablet Take 1 tablet by mouth daily For kidney protection    tiZANidine (ZANAFLEX) 4 MG tablet Take 1/2 to 1 tablet orally at bedtime as needed for muscle spasm. Cause drowsiness and dizziness    Lancets MISC Take BS as directed BID. E11.9    fluticasone (FLONASE) 50 MCG/ACT nasal spray 2 sprays by Nasal route daily     No current facility-administered medications for this visit. REVIEW OF SYSTEMS:   CONSTITUTIONAL:   There is no history of fever, chills, night sweats, weight loss, weight gain, persistent fatigue, or lethargy/hypersomnolence. CARDIAC:   No chest pain, pressure, discomfort, palpitations, orthopnea, murmurs, or edema. GI:   No dysphagia, heartburn reflux, nausea/vomiting, diarrhea, abdominal pain, or bleeding. NEURO:   There is no history of AMS, persistent headache, decreased level of consciousness, seizures, or motor or sensory deficits. PHYSICAL EXAM:    Vitals:    02/01/23 1002   BP: 134/85   Pulse: 71   Temp: 97 °F (36.1 °C)   TempSrc: Skin   SpO2: 100%   Weight: 217 lb (98.4 kg)   Height: 5' 8\" (1.727 m)        Body mass index is 32.99 kg/m². GENERAL APPEARANCE:   The patient is overweight and in no respiratory distress. HEENT:   PERRL.   Conjunctivae unremarkable. Nasal mucosa is without epistaxis, exudate, or polyps. Nares patent bilateral.  Gums and dentition are unremarkable. There is oropharyngeal narrowing. Dotson score 3. NECK/LYMPHATIC:   Symmetrical with no elevation of jugular venous pulsation. Trachea midline. No thyroid enlargement. No cervical adenopathy. LUNGS:   Normal respiratory effort with symmetrical lung expansion. Breath sounds clear. HEART:   There is a regular rate and rhythm. No murmur, rub, or gallop. There is no edema in the lower extremities. ABDOMEN:   Soft and non-tender. No hepatosplenomegaly. Bowel sounds are normal.     NEURO:   The patient is alert and oriented to person, place, and time. Memory appears intact and mood is normal.  No gross sensorimotor deficits are present. ASSESSMENT:  (Medical Decision Making)       ICD-10-CM    1. YOLA (obstructive sleep apnea)  G47.33 External Referral To Dentistry -patient struggled with becoming compliant with CPAP therapy. He is willing to try the CPAP again at lower pressure settings and he was instructed to use nasal saline 2 times daily to help with nasal congestion. We did decrease pressure settings to 4-8 cm H2O. He also wants a referral to dentistry to talk to them about the potential of getting a dental appliance. Referral will be sent today. 2. Nocturnal hypoxemia  G47.34 Mild.  Should improve with consistent CPAP use or potential dental appliance to correct sleep apnea            PLAN:    Continue to try and use CPAP at new pressure settings of 4-8 cm H2O nightly  Referral to sleep medicine dentistry  Start nasal saline 2 sprays in each nostril in the morning and in the evening  Follow-up in 3 months or sooner if needed      Orders Placed This Encounter   Procedures    External Referral To Dentistry     Referral Priority:   Routine     Referral Type:   Eval and Treat     Referral Reason:   Specialty Services Required     Requested Specialty: Dentistry     Number of Visits Requested:   1               Collaborating Physician: Dr. Bravo Favorite    Over 50% of today's office visit was spent in face to face time reviewing test results, prognosis, importance of compliance, education about disease process, benefits of medications, instructions for management of acute flare-ups, and follow up plans. Total face to face time spent with patient was 20 minutes.         SHERIF Neves CNP  Electronically signed

## 2023-01-31 ENCOUNTER — TELEPHONE (OUTPATIENT)
Dept: SLEEP MEDICINE | Age: 63
End: 2023-01-31

## 2023-02-01 ENCOUNTER — OFFICE VISIT (OUTPATIENT)
Dept: SLEEP MEDICINE | Age: 63
End: 2023-02-01
Payer: COMMERCIAL

## 2023-02-01 ENCOUNTER — TELEPHONE (OUTPATIENT)
Dept: SLEEP MEDICINE | Age: 63
End: 2023-02-01

## 2023-02-01 VITALS
HEART RATE: 71 BPM | SYSTOLIC BLOOD PRESSURE: 134 MMHG | BODY MASS INDEX: 32.89 KG/M2 | WEIGHT: 217 LBS | OXYGEN SATURATION: 100 % | HEIGHT: 68 IN | TEMPERATURE: 97 F | DIASTOLIC BLOOD PRESSURE: 85 MMHG

## 2023-02-01 DIAGNOSIS — G47.34 NOCTURNAL HYPOXEMIA: ICD-10-CM

## 2023-02-01 DIAGNOSIS — G47.33 OSA (OBSTRUCTIVE SLEEP APNEA): Primary | ICD-10-CM

## 2023-02-01 PROCEDURE — 99213 OFFICE O/P EST LOW 20 MIN: CPT | Performed by: NURSE PRACTITIONER

## 2023-02-01 ASSESSMENT — SLEEP AND FATIGUE QUESTIONNAIRES
ESS TOTAL SCORE: 4
HOW LIKELY ARE YOU TO NOD OFF OR FALL ASLEEP WHILE SITTING AND TALKING TO SOMEONE: 0
HOW LIKELY ARE YOU TO NOD OFF OR FALL ASLEEP IN A CAR, WHILE STOPPED FOR A FEW MINUTES IN TRAFFIC: 0
HOW LIKELY ARE YOU TO NOD OFF OR FALL ASLEEP WHILE SITTING AND READING: 1
HOW LIKELY ARE YOU TO NOD OFF OR FALL ASLEEP WHILE WATCHING TV: 1
HOW LIKELY ARE YOU TO NOD OFF OR FALL ASLEEP WHILE LYING DOWN TO REST IN THE AFTERNOON WHEN CIRCUMSTANCES PERMIT: 0
HOW LIKELY ARE YOU TO NOD OFF OR FALL ASLEEP WHILE SITTING QUIETLY AFTER LUNCH WITHOUT ALCOHOL: 1
HOW LIKELY ARE YOU TO NOD OFF OR FALL ASLEEP WHEN YOU ARE A PASSENGER IN A CAR FOR AN HOUR WITHOUT A BREAK: 0
HOW LIKELY ARE YOU TO NOD OFF OR FALL ASLEEP WHILE SITTING INACTIVE IN A PUBLIC PLACE: 1

## 2023-02-01 NOTE — TELEPHONE ENCOUNTER
Faxed referral , chart notes, demographics, sleep study, and insurance info for oral appliance to  [facility]   at [phone].

## 2023-02-01 NOTE — PATIENT INSTRUCTIONS
Continue to try and use CPAP at new pressure settings of 4-8 cm H2O nightly  Referral to sleep medicine dentistry  Start nasal saline 2 sprays in each nostril in the morning and in the evening  Follow-up in 3 months or sooner if needed

## 2023-03-02 ENCOUNTER — PROCEDURE VISIT (OUTPATIENT)
Dept: PHYSICAL MEDICINE AND REHAB | Age: 63
End: 2023-03-02
Payer: COMMERCIAL

## 2023-03-02 VITALS
WEIGHT: 217 LBS | BODY MASS INDEX: 32.89 KG/M2 | HEART RATE: 86 BPM | SYSTOLIC BLOOD PRESSURE: 126 MMHG | DIASTOLIC BLOOD PRESSURE: 89 MMHG | HEIGHT: 68 IN

## 2023-03-02 DIAGNOSIS — G56.01 RIGHT CARPAL TUNNEL SYNDROME: Primary | ICD-10-CM

## 2023-03-02 PROCEDURE — 95909 NRV CNDJ TST 5-6 STUDIES: CPT | Performed by: PHYSICAL MEDICINE & REHABILITATION

## 2023-03-13 RX ORDER — TIZANIDINE 4 MG/1
TABLET ORAL
Qty: 90 TABLET | Refills: 1 | Status: SHIPPED | OUTPATIENT
Start: 2023-03-13

## 2023-03-14 ENCOUNTER — OFFICE VISIT (OUTPATIENT)
Dept: ORTHOPEDIC SURGERY | Age: 63
End: 2023-03-14
Payer: COMMERCIAL

## 2023-03-14 DIAGNOSIS — G56.01 RIGHT CARPAL TUNNEL SYNDROME: Primary | ICD-10-CM

## 2023-03-14 DIAGNOSIS — M72.0 DUPUYTREN'S CONTRACTURE OF RIGHT HAND: ICD-10-CM

## 2023-03-14 DIAGNOSIS — M65.331 TRIGGER FINGER, RIGHT MIDDLE FINGER: ICD-10-CM

## 2023-03-14 DIAGNOSIS — M65.341 TRIGGER FINGER, RIGHT RING FINGER: ICD-10-CM

## 2023-03-14 PROCEDURE — 99214 OFFICE O/P EST MOD 30 MIN: CPT | Performed by: ORTHOPAEDIC SURGERY

## 2023-03-14 NOTE — PROGRESS NOTES
Orthopaedic Hand Clinic Note    Name: Bailee Tubbs  Age: 58 y.o. YOB: 1960  Gender: male  MRN: [id number]      Follow up visit:   1. Right carpal tunnel syndrome    2. Trigger finger, right middle finger    3. Trigger finger, right ring finger    4. Dupuytren's contracture of right hand        HPI: Bailee Tubbs is a 58 y.o. male who is following up for right carpal tunnel syndrome, right middle and ring trigger fingers, on the last visit I performed a carpal tunnel steroid injection which is providing about 70% relief of all symptoms, he still has some numbness intermittently in the mornings. ROS/Meds/PSH/PMH/FH/SH: I personally reviewed the patients standard intake form. Pertinents are discussed in the HPI    Physical Examination:  General: Awake and alert. HEENT: Normocephalic, atraumatic  CV/Pulm: Breathing even and unlabored  Skin: No obvious rashes noted. Lymphatic: No obvious evidence of lymphedema or lymphadenopathy    Musculoskeletal Examination:    Examination on the right upper extremity demonstrates, Decreased sensation and good cap refill in median distribution, positive carpal tunnel compression test, very mild tenderness over the middle and ring A1 pulley with palpable clicking and Negative  locking. Imaging / Electrodiagnostic Tests:     Nerve conduction study was reviewed with patient, this demonstrates severe right carpal tunnel syndrome with sensory latency of 4.0, sensory amplitude of 10.4 and motor latency of 4.9    Assessment:   1. Right carpal tunnel syndrome    2. Trigger finger, right middle finger    3. Trigger finger, right ring finger    4. Dupuytren's contracture of right hand        Plan:   We discussed the diagnosis and different treatment options. We discussed observation, therapy, antiinflammatory medications and other pertinent treatment modalities.     After discussing in detail the patient elects to proceed with Mobic 15 daily for 2 months, the patient will return on an as-needed basis, we discussed that he has severe right carpal tunnel syndrome and more likely than not he will require surgery however, at this point the injection is providing partial relief of his symptoms, the trigger fingers have also been only mildly affecting him since the last set of injections, we will address all these conditions on the following visit.     Patient voiced accordance and understanding of the information provided and the formulated plan. All questions were answered to the patient's satisfaction during the encounter.    Nina Leon MD  Orthopaedic Surgery  03/14/23  8:17 AM

## 2023-04-17 ENCOUNTER — NURSE ONLY (OUTPATIENT)
Dept: FAMILY MEDICINE CLINIC | Facility: CLINIC | Age: 63
End: 2023-04-17
Payer: COMMERCIAL

## 2023-04-17 DIAGNOSIS — Z12.5 SPECIAL SCREENING FOR MALIGNANT NEOPLASM OF PROSTATE: ICD-10-CM

## 2023-04-17 DIAGNOSIS — E78.00 PURE HYPERCHOLESTEROLEMIA, UNSPECIFIED: ICD-10-CM

## 2023-04-17 DIAGNOSIS — Z11.59 NEED FOR HEPATITIS C SCREENING TEST: ICD-10-CM

## 2023-04-17 DIAGNOSIS — Z11.4 SCREENING FOR HUMAN IMMUNODEFICIENCY VIRUS: ICD-10-CM

## 2023-04-17 DIAGNOSIS — E11.9 TYPE 2 DIABETES MELLITUS WITHOUT COMPLICATION, WITHOUT LONG-TERM CURRENT USE OF INSULIN (HCC): ICD-10-CM

## 2023-04-17 DIAGNOSIS — E55.9 VITAMIN D DEFICIENCY, UNSPECIFIED: ICD-10-CM

## 2023-04-17 LAB
25(OH)D3 SERPL-MCNC: 45.6 NG/ML (ref 30–100)
ALBUMIN SERPL-MCNC: 3.9 G/DL (ref 3.2–4.6)
ALBUMIN/GLOB SERPL: 1.1 (ref 0.4–1.6)
ALP SERPL-CCNC: 84 U/L (ref 50–136)
ALT SERPL-CCNC: 54 U/L (ref 12–65)
ANION GAP SERPL CALC-SCNC: 4 MMOL/L (ref 2–11)
AST SERPL-CCNC: 27 U/L (ref 15–37)
BASOPHILS # BLD: 0 K/UL (ref 0–0.2)
BASOPHILS NFR BLD: 1 % (ref 0–2)
BILIRUB SERPL-MCNC: 0.7 MG/DL (ref 0.2–1.1)
BILIRUBIN, URINE, POC: NEGATIVE
BLOOD URINE, POC: NEGATIVE
BUN SERPL-MCNC: 14 MG/DL (ref 8–23)
CALCIUM SERPL-MCNC: 9.3 MG/DL (ref 8.3–10.4)
CHLORIDE SERPL-SCNC: 105 MMOL/L (ref 101–110)
CHOLEST SERPL-MCNC: 173 MG/DL
CO2 SERPL-SCNC: 28 MMOL/L (ref 21–32)
CREAT SERPL-MCNC: 0.9 MG/DL (ref 0.8–1.5)
DIFFERENTIAL METHOD BLD: NORMAL
EOSINOPHIL # BLD: 0.2 K/UL (ref 0–0.8)
EOSINOPHIL NFR BLD: 3 % (ref 0.5–7.8)
ERYTHROCYTE [DISTWIDTH] IN BLOOD BY AUTOMATED COUNT: 14.6 % (ref 11.9–14.6)
GLOBULIN SER CALC-MCNC: 3.4 G/DL (ref 2.8–4.5)
GLUCOSE SERPL-MCNC: 125 MG/DL (ref 65–100)
GLUCOSE URINE, POC: ABNORMAL
HCT VFR BLD AUTO: 48.7 % (ref 41.1–50.3)
HCV AB SER QL: NONREACTIVE
HDLC SERPL-MCNC: 39 MG/DL (ref 40–60)
HDLC SERPL: 4.4
HGB BLD-MCNC: 15.8 G/DL (ref 13.6–17.2)
HIV 1+2 AB+HIV1 P24 AG SERPL QL IA: NONREACTIVE
HIV 1/2 RESULT COMMENT: NORMAL
IMM GRANULOCYTES # BLD AUTO: 0 K/UL (ref 0–0.5)
IMM GRANULOCYTES NFR BLD AUTO: 0 % (ref 0–5)
KETONES, URINE, POC: NEGATIVE
LDLC SERPL CALC-MCNC: 119.2 MG/DL
LEUKOCYTE ESTERASE, URINE, POC: NEGATIVE
LYMPHOCYTES # BLD: 2.3 K/UL (ref 0.5–4.6)
LYMPHOCYTES NFR BLD: 37 % (ref 13–44)
MCH RBC QN AUTO: 29.5 PG (ref 26.1–32.9)
MCHC RBC AUTO-ENTMCNC: 32.4 G/DL (ref 31.4–35)
MCV RBC AUTO: 91 FL (ref 82–102)
MICROALB/CREAT RATIO, POC: ABNORMAL
MICROALBUMIN URINE, POC: 50 MG/L
MONOCYTES # BLD: 0.7 K/UL (ref 0.1–1.3)
MONOCYTES NFR BLD: 11 % (ref 4–12)
NEUTS SEG # BLD: 3 K/UL (ref 1.7–8.2)
NEUTS SEG NFR BLD: 48 % (ref 43–78)
NITRITE, URINE, POC: NEGATIVE
NRBC # BLD: 0 K/UL (ref 0–0.2)
PH, URINE, POC: 5.5 (ref 4.6–8)
PLATELET # BLD AUTO: 188 K/UL (ref 150–450)
PMV BLD AUTO: 9.4 FL (ref 9.4–12.3)
POTASSIUM SERPL-SCNC: 4 MMOL/L (ref 3.5–5.1)
PROT SERPL-MCNC: 7.3 G/DL (ref 6.3–8.2)
PROTEIN,URINE, POC: NEGATIVE
PSA SERPL-MCNC: 1.1 NG/ML
RBC # BLD AUTO: 5.35 M/UL (ref 4.23–5.6)
SODIUM SERPL-SCNC: 137 MMOL/L (ref 133–143)
SPECIFIC GRAVITY, URINE, POC: 1.02 (ref 1–1.03)
TRIGL SERPL-MCNC: 74 MG/DL (ref 35–150)
TSH W FREE THYROID IF ABNORMAL: 0.65 UIU/ML (ref 0.36–3.74)
URINALYSIS CLARITY, POC: CLEAR
URINALYSIS COLOR, POC: YELLOW
UROBILINOGEN, POC: NORMAL
VLDLC SERPL CALC-MCNC: 14.8 MG/DL (ref 6–23)
WBC # BLD AUTO: 6.1 K/UL (ref 4.3–11.1)

## 2023-04-17 PROCEDURE — 81002 URINALYSIS NONAUTO W/O SCOPE: CPT | Performed by: NURSE PRACTITIONER

## 2023-04-17 PROCEDURE — 82043 UR ALBUMIN QUANTITATIVE: CPT | Performed by: NURSE PRACTITIONER

## 2023-04-17 PROCEDURE — 36415 COLL VENOUS BLD VENIPUNCTURE: CPT | Performed by: NURSE PRACTITIONER

## 2023-04-18 LAB
EST. AVERAGE GLUCOSE BLD GHB EST-MCNC: 143 MG/DL
HBA1C MFR BLD: 6.6 % (ref 4.8–5.6)

## 2023-04-24 ENCOUNTER — OFFICE VISIT (OUTPATIENT)
Dept: FAMILY MEDICINE CLINIC | Facility: CLINIC | Age: 63
End: 2023-04-24
Payer: COMMERCIAL

## 2023-04-24 VITALS
HEIGHT: 68 IN | BODY MASS INDEX: 31.98 KG/M2 | DIASTOLIC BLOOD PRESSURE: 80 MMHG | SYSTOLIC BLOOD PRESSURE: 128 MMHG | HEART RATE: 78 BPM | OXYGEN SATURATION: 98 % | WEIGHT: 211 LBS

## 2023-04-24 DIAGNOSIS — E78.5 HYPERLIPIDEMIA LDL GOAL <70: ICD-10-CM

## 2023-04-24 DIAGNOSIS — E55.9 VITAMIN D DEFICIENCY, UNSPECIFIED: ICD-10-CM

## 2023-04-24 DIAGNOSIS — Z87.891 PERSONAL HISTORY OF TOBACCO USE, PRESENTING HAZARDS TO HEALTH: ICD-10-CM

## 2023-04-24 DIAGNOSIS — Z00.00 ROUTINE GENERAL MEDICAL EXAMINATION AT A HEALTH CARE FACILITY: Primary | ICD-10-CM

## 2023-04-24 DIAGNOSIS — E11.9 TYPE 2 DIABETES MELLITUS WITHOUT COMPLICATION, WITHOUT LONG-TERM CURRENT USE OF INSULIN (HCC): ICD-10-CM

## 2023-04-24 DIAGNOSIS — Z77.098 EXPOSURE TO INDUSTRIAL FUMES: ICD-10-CM

## 2023-04-24 PROCEDURE — 99213 OFFICE O/P EST LOW 20 MIN: CPT | Performed by: NURSE PRACTITIONER

## 2023-04-24 PROCEDURE — 99396 PREV VISIT EST AGE 40-64: CPT | Performed by: NURSE PRACTITIONER

## 2023-04-24 RX ORDER — ROSUVASTATIN CALCIUM 10 MG/1
10 TABLET, COATED ORAL NIGHTLY
Qty: 90 TABLET | Refills: 1 | Status: SHIPPED | OUTPATIENT
Start: 2023-04-24

## 2023-04-24 SDOH — ECONOMIC STABILITY: FOOD INSECURITY: WITHIN THE PAST 12 MONTHS, THE FOOD YOU BOUGHT JUST DIDN'T LAST AND YOU DIDN'T HAVE MONEY TO GET MORE.: NEVER TRUE

## 2023-04-24 SDOH — ECONOMIC STABILITY: FOOD INSECURITY: WITHIN THE PAST 12 MONTHS, YOU WORRIED THAT YOUR FOOD WOULD RUN OUT BEFORE YOU GOT MONEY TO BUY MORE.: NEVER TRUE

## 2023-04-24 SDOH — ECONOMIC STABILITY: HOUSING INSECURITY
IN THE LAST 12 MONTHS, WAS THERE A TIME WHEN YOU DID NOT HAVE A STEADY PLACE TO SLEEP OR SLEPT IN A SHELTER (INCLUDING NOW)?: NO

## 2023-04-24 SDOH — ECONOMIC STABILITY: INCOME INSECURITY: HOW HARD IS IT FOR YOU TO PAY FOR THE VERY BASICS LIKE FOOD, HOUSING, MEDICAL CARE, AND HEATING?: NOT HARD AT ALL

## 2023-04-24 ASSESSMENT — ENCOUNTER SYMPTOMS
EYE PAIN: 0
BLOOD IN STOOL: 0
RECTAL PAIN: 0
VOICE CHANGE: 0
ABDOMINAL PAIN: 0
SINUS PAIN: 0
COLOR CHANGE: 0
EYE DISCHARGE: 0
CONSTIPATION: 0
SHORTNESS OF BREATH: 0
BACK PAIN: 0
STRIDOR: 0
CHEST TIGHTNESS: 0
COUGH: 0
EYE REDNESS: 0
GASTROINTESTINAL NEGATIVE: 1
EYE ITCHING: 0
WHEEZING: 0
FACIAL SWELLING: 0
SINUS PRESSURE: 0
APNEA: 0
VOMITING: 0
EYES NEGATIVE: 1
CHOKING: 0
PHOTOPHOBIA: 0
ALLERGIC/IMMUNOLOGIC NEGATIVE: 1
TROUBLE SWALLOWING: 0
NAUSEA: 0
RESPIRATORY NEGATIVE: 1
ABDOMINAL DISTENTION: 0
SORE THROAT: 0
ANAL BLEEDING: 0
DIARRHEA: 0
RHINORRHEA: 0

## 2023-04-24 ASSESSMENT — PATIENT HEALTH QUESTIONNAIRE - PHQ9
SUM OF ALL RESPONSES TO PHQ QUESTIONS 1-9: 0
2. FEELING DOWN, DEPRESSED OR HOPELESS: 0
SUM OF ALL RESPONSES TO PHQ QUESTIONS 1-9: 0
SUM OF ALL RESPONSES TO PHQ9 QUESTIONS 1 & 2: 0
SUM OF ALL RESPONSES TO PHQ QUESTIONS 1-9: 0
SUM OF ALL RESPONSES TO PHQ QUESTIONS 1-9: 0
1. LITTLE INTEREST OR PLEASURE IN DOING THINGS: 0

## 2023-04-24 NOTE — PROGRESS NOTES
PROGRESS NOTE    Chief Complaint   Patient presents with    Annual Exam    Discuss Labs       SUBJECTIVE:     Linda Yanes is a very pleasant 58 y.o. male with hx of diabetes and hyperlipidemia, seen today in office for his lab results review. He is due for his annual physical.  He reports doing well overall. He reports he has been seen a few times by his provider at the [place] for several testings. He also has been seen and evaluated by orthopedic and received a steroid injection for his trigger finger. He reports still having intermittent numbness and tingling to his hands but they are relieved with use of compression braces at night. He was recommended for surgery but decided to wait until a later time when his symptoms are worsened. He reports having seen his ophthalmologist recently in February and is recommended for return follow-up in June. He currently follows [facility]. He reports that he smoked about 5 cigarettes/day for about 20 years. 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 Past Medical History, Past Surgical History, Family history, Social History, and Medications were all reviewed with the patient today and updated as necessary. Current Outpatient Medications   Medication Sig Dispense Refill    rosuvastatin (CRESTOR) 10 MG tablet Take 1 tablet by mouth at bedtime For cholesterol 90 tablet 1    tiZANidine (ZANAFLEX) 4 MG tablet TAKE 1/2 TO 1 TABLET ORALLY AT BEDTIME AS NEEDED FOR MUSCLE SPASM.  CAUSE DROWSINESS AND DIZZINESS 90 tablet 1    empagliflozin (JARDIANCE) 25 MG tablet Take 1 tablet by mouth daily In the morning for diabetes 90 tablet 3    ezetimibe (ZETIA) 10 MG tablet Take 1 tablet by mouth daily For cholesterol 90 tablet 3    glimepiride (AMARYL) 4 MG tablet Take 1 tablet by mouth daily (with breakfast) For diabetes 90 tablet 3    naproxen (NAPROSYN) 500 MG tablet TAKE 1 TABLET BY MOUTH TWICE A DAY WITH MEALS as

## 2023-06-19 RX ORDER — TIZANIDINE 4 MG/1
TABLET ORAL
Qty: 90 TABLET | Refills: 1 | Status: SHIPPED | OUTPATIENT
Start: 2023-06-19

## 2023-08-17 DIAGNOSIS — E11.9 TYPE 2 DIABETES MELLITUS WITHOUT COMPLICATION, WITHOUT LONG-TERM CURRENT USE OF INSULIN (HCC): Primary | ICD-10-CM

## 2023-08-17 DIAGNOSIS — E78.5 HYPERLIPIDEMIA LDL GOAL <70: ICD-10-CM

## 2023-08-18 ENCOUNTER — NURSE ONLY (OUTPATIENT)
Dept: FAMILY MEDICINE CLINIC | Facility: CLINIC | Age: 63
End: 2023-08-18
Payer: COMMERCIAL

## 2023-08-18 DIAGNOSIS — E11.9 TYPE 2 DIABETES MELLITUS WITHOUT COMPLICATION, WITHOUT LONG-TERM CURRENT USE OF INSULIN (HCC): ICD-10-CM

## 2023-08-18 LAB
ALBUMIN SERPL-MCNC: 4 G/DL (ref 3.2–4.6)
ALBUMIN/GLOB SERPL: 1.3 (ref 0.4–1.6)
ALP SERPL-CCNC: 106 U/L (ref 50–136)
ALT SERPL-CCNC: 65 U/L (ref 12–65)
ANION GAP SERPL CALC-SCNC: 4 MMOL/L (ref 2–11)
AST SERPL-CCNC: 35 U/L (ref 15–37)
BASOPHILS # BLD: 0 K/UL (ref 0–0.2)
BASOPHILS NFR BLD: 1 % (ref 0–2)
BILIRUB SERPL-MCNC: 0.4 MG/DL (ref 0.2–1.1)
BILIRUBIN, URINE, POC: NEGATIVE
BLOOD URINE, POC: NEGATIVE
BUN SERPL-MCNC: 17 MG/DL (ref 8–23)
CALCIUM SERPL-MCNC: 9.3 MG/DL (ref 8.3–10.4)
CHLORIDE SERPL-SCNC: 112 MMOL/L (ref 101–110)
CHOLEST SERPL-MCNC: 85 MG/DL
CO2 SERPL-SCNC: 28 MMOL/L (ref 21–32)
CREAT SERPL-MCNC: 0.9 MG/DL (ref 0.8–1.5)
DIFFERENTIAL METHOD BLD: ABNORMAL
EOSINOPHIL # BLD: 0.2 K/UL (ref 0–0.8)
EOSINOPHIL NFR BLD: 4 % (ref 0.5–7.8)
ERYTHROCYTE [DISTWIDTH] IN BLOOD BY AUTOMATED COUNT: 14.7 % (ref 11.9–14.6)
GLOBULIN SER CALC-MCNC: 3.1 G/DL (ref 2.8–4.5)
GLUCOSE SERPL-MCNC: 110 MG/DL (ref 65–100)
GLUCOSE URINE, POC: NEGATIVE
HCT VFR BLD AUTO: 48.6 % (ref 41.1–50.3)
HDLC SERPL-MCNC: 33 MG/DL (ref 40–60)
HDLC SERPL: 2.6
HGB BLD-MCNC: 15.6 G/DL (ref 13.6–17.2)
IMM GRANULOCYTES # BLD AUTO: 0 K/UL (ref 0–0.5)
IMM GRANULOCYTES NFR BLD AUTO: 0 % (ref 0–5)
KETONES, URINE, POC: NEGATIVE
LDLC SERPL CALC-MCNC: 34 MG/DL
LEUKOCYTE ESTERASE, URINE, POC: NEGATIVE
LYMPHOCYTES # BLD: 2.6 K/UL (ref 0.5–4.6)
LYMPHOCYTES NFR BLD: 47 % (ref 13–44)
MCH RBC QN AUTO: 29.4 PG (ref 26.1–32.9)
MCHC RBC AUTO-ENTMCNC: 32.1 G/DL (ref 31.4–35)
MCV RBC AUTO: 91.7 FL (ref 82–102)
MICROALB/CREAT RATIO, POC: ABNORMAL
MICROALBUMIN URINE, POC: 20 MG/L
MONOCYTES # BLD: 0.5 K/UL (ref 0.1–1.3)
MONOCYTES NFR BLD: 9 % (ref 4–12)
NEUTS SEG # BLD: 2.1 K/UL (ref 1.7–8.2)
NEUTS SEG NFR BLD: 39 % (ref 43–78)
NITRITE, URINE, POC: NEGATIVE
NRBC # BLD: 0 K/UL (ref 0–0.2)
PH, URINE, POC: 5.5 (ref 4.6–8)
PLATELET # BLD AUTO: 171 K/UL (ref 150–450)
PMV BLD AUTO: 10.2 FL (ref 9.4–12.3)
POTASSIUM SERPL-SCNC: 4.3 MMOL/L (ref 3.5–5.1)
PROT SERPL-MCNC: 7.1 G/DL (ref 6.3–8.2)
PROTEIN,URINE, POC: NEGATIVE
RBC # BLD AUTO: 5.3 M/UL (ref 4.23–5.6)
SODIUM SERPL-SCNC: 144 MMOL/L (ref 133–143)
SPECIFIC GRAVITY, URINE, POC: 1.02 (ref 1–1.03)
TRIGL SERPL-MCNC: 90 MG/DL (ref 35–150)
TSH W FREE THYROID IF ABNORMAL: 0.69 UIU/ML (ref 0.36–3.74)
URINALYSIS CLARITY, POC: CLEAR
URINALYSIS COLOR, POC: YELLOW
UROBILINOGEN, POC: NORMAL
VLDLC SERPL CALC-MCNC: 18 MG/DL (ref 6–23)
WBC # BLD AUTO: 5.5 K/UL (ref 4.3–11.1)

## 2023-08-18 PROCEDURE — 82043 UR ALBUMIN QUANTITATIVE: CPT | Performed by: NURSE PRACTITIONER

## 2023-08-18 PROCEDURE — 36415 COLL VENOUS BLD VENIPUNCTURE: CPT | Performed by: NURSE PRACTITIONER

## 2023-08-18 PROCEDURE — 81003 URINALYSIS AUTO W/O SCOPE: CPT | Performed by: NURSE PRACTITIONER

## 2023-08-19 LAB
EST. AVERAGE GLUCOSE BLD GHB EST-MCNC: 140 MG/DL
HBA1C MFR BLD: 6.5 % (ref 4.8–5.6)

## 2023-08-22 ASSESSMENT — PATIENT HEALTH QUESTIONNAIRE - PHQ9
SUM OF ALL RESPONSES TO PHQ QUESTIONS 1-9: 0
SUM OF ALL RESPONSES TO PHQ QUESTIONS 1-9: 0
1. LITTLE INTEREST OR PLEASURE IN DOING THINGS: NOT AT ALL
SUM OF ALL RESPONSES TO PHQ QUESTIONS 1-9: 0
2. FEELING DOWN, DEPRESSED OR HOPELESS: 0
SUM OF ALL RESPONSES TO PHQ QUESTIONS 1-9: 0
2. FEELING DOWN, DEPRESSED OR HOPELESS: NOT AT ALL
1. LITTLE INTEREST OR PLEASURE IN DOING THINGS: 0
SUM OF ALL RESPONSES TO PHQ9 QUESTIONS 1 & 2: 0
SUM OF ALL RESPONSES TO PHQ9 QUESTIONS 1 & 2: 0

## 2023-08-25 ENCOUNTER — OFFICE VISIT (OUTPATIENT)
Dept: FAMILY MEDICINE CLINIC | Facility: CLINIC | Age: 63
End: 2023-08-25
Payer: COMMERCIAL

## 2023-08-25 VITALS
SYSTOLIC BLOOD PRESSURE: 130 MMHG | HEIGHT: 68 IN | DIASTOLIC BLOOD PRESSURE: 82 MMHG | OXYGEN SATURATION: 97 % | WEIGHT: 214.2 LBS | BODY MASS INDEX: 32.46 KG/M2 | HEART RATE: 81 BPM

## 2023-08-25 DIAGNOSIS — E55.9 VITAMIN D DEFICIENCY, UNSPECIFIED: ICD-10-CM

## 2023-08-25 DIAGNOSIS — E11.9 TYPE 2 DIABETES MELLITUS WITHOUT COMPLICATION, WITHOUT LONG-TERM CURRENT USE OF INSULIN (HCC): ICD-10-CM

## 2023-08-25 DIAGNOSIS — E78.5 HYPERLIPIDEMIA LDL GOAL <70: ICD-10-CM

## 2023-08-25 DIAGNOSIS — I73.9 CLAUDICATION OF BOTH LOWER EXTREMITIES (HCC): Primary | ICD-10-CM

## 2023-08-25 PROCEDURE — 3044F HG A1C LEVEL LT 7.0%: CPT | Performed by: NURSE PRACTITIONER

## 2023-08-25 PROCEDURE — 99214 OFFICE O/P EST MOD 30 MIN: CPT | Performed by: NURSE PRACTITIONER

## 2023-08-25 RX ORDER — TIZANIDINE 4 MG/1
TABLET ORAL
Qty: 90 TABLET | Refills: 3 | Status: SHIPPED | OUTPATIENT
Start: 2023-08-25

## 2023-08-25 RX ORDER — EZETIMIBE 10 MG/1
10 TABLET ORAL DAILY
Qty: 90 TABLET | Refills: 3 | Status: SHIPPED | OUTPATIENT
Start: 2023-08-25

## 2023-08-25 RX ORDER — GLIMEPIRIDE 4 MG/1
4 TABLET ORAL
Qty: 90 TABLET | Refills: 3 | Status: SHIPPED | OUTPATIENT
Start: 2023-08-25

## 2023-08-25 RX ORDER — NAPROXEN 500 MG/1
TABLET ORAL
Qty: 180 TABLET | Refills: 3 | Status: SHIPPED | OUTPATIENT
Start: 2023-08-25

## 2023-08-25 RX ORDER — LISINOPRIL 2.5 MG/1
2.5 TABLET ORAL DAILY
Qty: 90 TABLET | Refills: 3 | Status: SHIPPED | OUTPATIENT
Start: 2023-08-25

## 2023-08-25 RX ORDER — ROSUVASTATIN CALCIUM 10 MG/1
10 TABLET, COATED ORAL NIGHTLY
Qty: 90 TABLET | Refills: 3 | Status: SHIPPED | OUTPATIENT
Start: 2023-08-25

## 2023-08-25 ASSESSMENT — ENCOUNTER SYMPTOMS
COLOR CHANGE: 0
RHINORRHEA: 0
EYE ITCHING: 0
SHORTNESS OF BREATH: 0
DIARRHEA: 0
GASTROINTESTINAL NEGATIVE: 1
VOMITING: 0
ALLERGIC/IMMUNOLOGIC NEGATIVE: 1
SORE THROAT: 0
PHOTOPHOBIA: 0
ABDOMINAL DISTENTION: 0
EYES NEGATIVE: 1
BLOOD IN STOOL: 0
TROUBLE SWALLOWING: 0
SINUS PAIN: 0
NAUSEA: 0
RESPIRATORY NEGATIVE: 1
VOICE CHANGE: 0
EYE PAIN: 0
ANAL BLEEDING: 0
FACIAL SWELLING: 0
ABDOMINAL PAIN: 0
APNEA: 0
EYE REDNESS: 0
SINUS PRESSURE: 0
CHEST TIGHTNESS: 0
COUGH: 0
CONSTIPATION: 0
EYE DISCHARGE: 0
WHEEZING: 0
RECTAL PAIN: 0
STRIDOR: 0
CHOKING: 0
BACK PAIN: 0

## 2023-08-25 NOTE — PROGRESS NOTES
diaphoretic. HENT:      Head: Normocephalic and atraumatic. Right Ear: Tympanic membrane, ear canal and external ear normal.      Left Ear: Tympanic membrane, ear canal and external ear normal.      Nose: Nose normal.      Mouth/Throat:      Mouth: Mucous membranes are moist.      Pharynx: Oropharynx is clear. Eyes:      Extraocular Movements: Extraocular movements intact. Conjunctiva/sclera: Conjunctivae normal.      Pupils: Pupils are equal, round, and reactive to light. Neck:      Vascular: No carotid bruit. Cardiovascular:      Rate and Rhythm: Normal rate and regular rhythm. Pulses: Normal pulses. Heart sounds: Normal heart sounds. Pulmonary:      Effort: Pulmonary effort is normal.      Breath sounds: Normal breath sounds. Abdominal:      General: Abdomen is flat. Bowel sounds are normal.      Palpations: Abdomen is soft. Musculoskeletal:      Cervical back: Normal range of motion and neck supple. No rigidity or tenderness. Lymphadenopathy:      Cervical: No cervical adenopathy. Skin:     General: Skin is warm and dry. Capillary Refill: Capillary refill takes less than 2 seconds. Comments: Left lower leg near ankle is cooler to touch in comparison to other area of lower extremity   Neurological:      General: No focal deficit present. Mental Status: He is alert and oriented to person, place, and time. Psychiatric:         Mood and Affect: Mood normal.         Behavior: Behavior normal.         Thought Content: Thought content normal.         Judgment: Judgment normal.         Medical problems and test results were reviewed with the patient today.    Lab Results   Component Value Date     (H) 08/18/2023    K 4.3 08/18/2023     (H) 08/18/2023    CO2 28 08/18/2023    BUN 17 08/18/2023    CREATININE 0.90 08/18/2023    GLUCOSE 110 (H) 08/18/2023    CALCIUM 9.3 08/18/2023    PROT 7.1 08/18/2023    LABALBU 4.0 08/18/2023    BILITOT 0.4 08/18/2023

## 2023-09-19 ENCOUNTER — HOSPITAL ENCOUNTER (OUTPATIENT)
Dept: ULTRASOUND IMAGING | Age: 63
Discharge: HOME | End: 2023-09-22
Payer: COMMERCIAL

## 2023-09-19 DIAGNOSIS — I73.9 CLAUDICATION OF BOTH LOWER EXTREMITIES (HCC): ICD-10-CM

## 2023-09-19 PROCEDURE — 93925 LOWER EXTREMITY STUDY: CPT

## 2023-11-29 DIAGNOSIS — E78.00 PURE HYPERCHOLESTEROLEMIA, UNSPECIFIED: ICD-10-CM

## 2023-11-29 RX ORDER — SIMVASTATIN 80 MG
40 TABLET ORAL
COMMUNITY
Start: 2023-02-02

## 2023-11-29 RX ORDER — SIMVASTATIN 40 MG
40 TABLET ORAL NIGHTLY
Qty: 90 TABLET | Refills: 3 | OUTPATIENT
Start: 2023-11-29

## 2024-02-23 ENCOUNTER — NURSE ONLY (OUTPATIENT)
Dept: FAMILY MEDICINE CLINIC | Facility: CLINIC | Age: 64
End: 2024-02-23
Payer: COMMERCIAL

## 2024-02-23 DIAGNOSIS — E11.9 TYPE 2 DIABETES MELLITUS WITHOUT COMPLICATION, WITHOUT LONG-TERM CURRENT USE OF INSULIN (HCC): Primary | ICD-10-CM

## 2024-02-23 LAB
ALBUMIN SERPL-MCNC: 4.2 G/DL (ref 3.2–4.6)
ALBUMIN/GLOB SERPL: 1.5 (ref 0.4–1.6)
ALP SERPL-CCNC: 72 U/L (ref 50–136)
ALT SERPL-CCNC: 64 U/L (ref 12–65)
ANION GAP SERPL CALC-SCNC: 4 MMOL/L (ref 2–11)
AST SERPL-CCNC: 42 U/L (ref 15–37)
BILIRUB SERPL-MCNC: 0.9 MG/DL (ref 0.2–1.1)
BUN SERPL-MCNC: 15 MG/DL (ref 8–23)
CALCIUM SERPL-MCNC: 9.4 MG/DL (ref 8.3–10.4)
CHLORIDE SERPL-SCNC: 105 MMOL/L (ref 103–113)
CHOLEST SERPL-MCNC: 109 MG/DL
CO2 SERPL-SCNC: 30 MMOL/L (ref 21–32)
CREAT SERPL-MCNC: 0.8 MG/DL (ref 0.8–1.5)
EST. AVERAGE GLUCOSE BLD GHB EST-MCNC: 134 MG/DL
GLOBULIN SER CALC-MCNC: 2.8 G/DL (ref 2.8–4.5)
GLUCOSE SERPL-MCNC: 113 MG/DL (ref 65–100)
HBA1C MFR BLD: 6.3 % (ref 4.8–5.6)
HDLC SERPL-MCNC: 38 MG/DL (ref 40–60)
HDLC SERPL: 2.9
LDLC SERPL CALC-MCNC: 58.6 MG/DL
POTASSIUM SERPL-SCNC: 4.3 MMOL/L (ref 3.5–5.1)
PROT SERPL-MCNC: 7 G/DL (ref 6.3–8.2)
SODIUM SERPL-SCNC: 139 MMOL/L (ref 136–146)
TRIGL SERPL-MCNC: 62 MG/DL (ref 35–150)
VLDLC SERPL CALC-MCNC: 12.4 MG/DL (ref 6–23)

## 2024-02-23 PROCEDURE — 36415 COLL VENOUS BLD VENIPUNCTURE: CPT | Performed by: NURSE PRACTITIONER

## 2024-06-18 ENCOUNTER — OFFICE VISIT (OUTPATIENT)
Dept: FAMILY MEDICINE CLINIC | Facility: CLINIC | Age: 64
End: 2024-06-18
Payer: COMMERCIAL

## 2024-06-18 VITALS
DIASTOLIC BLOOD PRESSURE: 76 MMHG | HEART RATE: 67 BPM | SYSTOLIC BLOOD PRESSURE: 130 MMHG | BODY MASS INDEX: 32.58 KG/M2 | OXYGEN SATURATION: 98 % | RESPIRATION RATE: 15 BRPM | WEIGHT: 215 LBS | HEIGHT: 68 IN | TEMPERATURE: 98 F

## 2024-06-18 DIAGNOSIS — Z23 ENCOUNTER FOR IMMUNIZATION: ICD-10-CM

## 2024-06-18 DIAGNOSIS — Z00.00 ROUTINE GENERAL MEDICAL EXAMINATION AT A HEALTH CARE FACILITY: Primary | ICD-10-CM

## 2024-06-18 DIAGNOSIS — E11.9 TYPE 2 DIABETES MELLITUS WITHOUT COMPLICATION, WITHOUT LONG-TERM CURRENT USE OF INSULIN (HCC): ICD-10-CM

## 2024-06-18 DIAGNOSIS — T14.8XXA MUSCLE STRAIN: ICD-10-CM

## 2024-06-18 DIAGNOSIS — E55.9 VITAMIN D DEFICIENCY, UNSPECIFIED: ICD-10-CM

## 2024-06-18 DIAGNOSIS — E78.5 HYPERLIPIDEMIA LDL GOAL <70: ICD-10-CM

## 2024-06-18 PROCEDURE — 99396 PREV VISIT EST AGE 40-64: CPT | Performed by: NURSE PRACTITIONER

## 2024-06-18 PROCEDURE — 90471 IMMUNIZATION ADMIN: CPT | Performed by: NURSE PRACTITIONER

## 2024-06-18 PROCEDURE — 90677 PCV20 VACCINE IM: CPT | Performed by: NURSE PRACTITIONER

## 2024-06-18 RX ORDER — NAPROXEN 500 MG/1
500 TABLET ORAL 2 TIMES DAILY WITH MEALS
Qty: 180 TABLET | Refills: 3 | Status: CANCELLED | OUTPATIENT
Start: 2024-06-18

## 2024-06-18 RX ORDER — ROSUVASTATIN CALCIUM 10 MG/1
10 TABLET, COATED ORAL NIGHTLY
Qty: 90 TABLET | Refills: 3 | Status: SHIPPED | OUTPATIENT
Start: 2024-06-18

## 2024-06-18 RX ORDER — GLIMEPIRIDE 4 MG/1
4 TABLET ORAL
Qty: 90 TABLET | Refills: 3 | Status: CANCELLED | OUTPATIENT
Start: 2024-06-18

## 2024-06-18 RX ORDER — EZETIMIBE 10 MG/1
10 TABLET ORAL DAILY
Qty: 90 TABLET | Refills: 3 | Status: CANCELLED | OUTPATIENT
Start: 2024-06-18

## 2024-06-18 RX ORDER — EZETIMIBE 10 MG/1
10 TABLET ORAL DAILY
Qty: 90 TABLET | Refills: 3 | Status: SHIPPED | OUTPATIENT
Start: 2024-06-18

## 2024-06-18 RX ORDER — GLIMEPIRIDE 4 MG/1
4 TABLET ORAL
Qty: 90 TABLET | Refills: 3 | Status: SHIPPED | OUTPATIENT
Start: 2024-06-18

## 2024-06-18 RX ORDER — ROSUVASTATIN CALCIUM 10 MG/1
10 TABLET, COATED ORAL NIGHTLY
Qty: 90 TABLET | Refills: 3 | Status: CANCELLED | OUTPATIENT
Start: 2024-06-18

## 2024-06-18 RX ORDER — TIZANIDINE 4 MG/1
TABLET ORAL
Qty: 90 TABLET | Refills: 3 | Status: CANCELLED | OUTPATIENT
Start: 2024-06-18

## 2024-06-18 RX ORDER — NAPROXEN 500 MG/1
TABLET ORAL
Qty: 180 TABLET | Refills: 3 | Status: SHIPPED | OUTPATIENT
Start: 2024-06-18

## 2024-06-18 RX ORDER — FLUTICASONE PROPIONATE 50 MCG
2 SPRAY, SUSPENSION (ML) NASAL DAILY
Qty: 48 G | Refills: 3 | Status: CANCELLED | OUTPATIENT
Start: 2024-06-18

## 2024-06-18 RX ORDER — SIMVASTATIN 80 MG
TABLET ORAL
Qty: 90 TABLET | Refills: 1 | Status: CANCELLED | OUTPATIENT
Start: 2024-06-18

## 2024-06-18 RX ORDER — TIZANIDINE 4 MG/1
TABLET ORAL
Qty: 90 TABLET | Refills: 3 | Status: SHIPPED | OUTPATIENT
Start: 2024-06-18

## 2024-06-18 ASSESSMENT — ENCOUNTER SYMPTOMS
APNEA: 0
PHOTOPHOBIA: 0
CHEST TIGHTNESS: 0
TROUBLE SWALLOWING: 0
ABDOMINAL DISTENTION: 0
BACK PAIN: 0
SINUS PAIN: 0
STRIDOR: 0
RHINORRHEA: 0
CHOKING: 0
RECTAL PAIN: 0
COUGH: 0
VOMITING: 0
EYE REDNESS: 0
CONSTIPATION: 0
SINUS PRESSURE: 0
ANAL BLEEDING: 0
ALLERGIC/IMMUNOLOGIC NEGATIVE: 1
BLOOD IN STOOL: 0
WHEEZING: 0
GASTROINTESTINAL NEGATIVE: 1
SHORTNESS OF BREATH: 0
NAUSEA: 0
DIARRHEA: 0
EYE ITCHING: 0
EYE DISCHARGE: 0
VOICE CHANGE: 0
FACIAL SWELLING: 0
ABDOMINAL PAIN: 0
EYE PAIN: 0
EYES NEGATIVE: 1
RESPIRATORY NEGATIVE: 1
COLOR CHANGE: 0
SORE THROAT: 0

## 2024-06-18 ASSESSMENT — PATIENT HEALTH QUESTIONNAIRE - PHQ9
SUM OF ALL RESPONSES TO PHQ QUESTIONS 1-9: 0
SUM OF ALL RESPONSES TO PHQ9 QUESTIONS 1 & 2: 0
2. FEELING DOWN, DEPRESSED OR HOPELESS: NOT AT ALL

## 2024-06-18 NOTE — PROGRESS NOTES
08/18/2023    TRIG 74 04/17/2023     Lab Results   Component Value Date    HDL 38 (L) 02/23/2024    HDL 33 (L) 08/18/2023    HDL 39 (L) 04/17/2023     Lab Results   Component Value Date    LDL 58.6 02/23/2024    LDL 34 08/18/2023    .2 (H) 04/17/2023     Lab Results   Component Value Date    VLDL 12.4 02/23/2024    VLDL 18 08/18/2023    VLDL 14.8 04/17/2023     Lab Results   Component Value Date    CHOLHDLRATIO 2.9 02/23/2024    CHOLHDLRATIO 2.6 08/18/2023    CHOLHDLRATIO 4.4 04/17/2023     Hemoglobin A1C   Date Value Ref Range Status   02/23/2024 6.3 (H) 4.8 - 5.6 % Final     Lab Results   Component Value Date    PSA 1.1 04/17/2023    PSA 0.8 04/14/2022    PSA 0.7 04/05/2021         ASSESSMENT and PLAN    1. Routine general medical examination at a health care facility   Anticipatory guidance for age-seatbelts, avoid cell phone use in car (may use hands free), no texting while driving, avoid social drugs and tobacco, limit alcohol, fall safety, personal safety with appropriate use of helmets and equipment for protection, and appropriate use of sun screen and sun protection to prevent skin cancer. Encourage healthy diet and exercise daily.     Up-to-date with colon cancer screening.    2. Encounter for immunization  -     Pneumococcal, PCV20, PREVNAR 20, (age 6w+), IM, PF    Patient is due for pneumonia vaccine. Discussed current CDC recommendation for immunization. Pt is amenable to receiving vaccine today in office. VIS provided. Vaccine administered.  Patient received his RSV vaccine in addition to his  shingle vaccine series at the pharmacy.    3. Type 2 diabetes mellitus without complication, without long-term current use of insulin (HCC)  -     glimepiride (AMARYL) 4 MG tablet; Take 1 tablet by mouth daily (with breakfast) For diabetes, Disp-90 tablet, R-3Normal  -     empagliflozin (JARDIANCE) 25 MG tablet; Take 1 tablet by mouth daily In the morning for diabetes, Disp-90 tablet, R-3Normal    Last A1c

## 2024-08-21 ENCOUNTER — OFFICE VISIT (OUTPATIENT)
Dept: FAMILY MEDICINE CLINIC | Facility: CLINIC | Age: 64
End: 2024-08-21
Payer: COMMERCIAL

## 2024-08-21 VITALS
HEIGHT: 68 IN | BODY MASS INDEX: 32.89 KG/M2 | TEMPERATURE: 98.4 F | HEART RATE: 91 BPM | OXYGEN SATURATION: 98 % | SYSTOLIC BLOOD PRESSURE: 138 MMHG | DIASTOLIC BLOOD PRESSURE: 80 MMHG | WEIGHT: 217 LBS | RESPIRATION RATE: 15 BRPM

## 2024-08-21 DIAGNOSIS — E11.9 TYPE 2 DIABETES MELLITUS WITHOUT COMPLICATION, WITHOUT LONG-TERM CURRENT USE OF INSULIN (HCC): ICD-10-CM

## 2024-08-21 DIAGNOSIS — M25.551 RIGHT HIP PAIN: ICD-10-CM

## 2024-08-21 DIAGNOSIS — R10.31 RIGHT GROIN PAIN: Primary | ICD-10-CM

## 2024-08-21 DIAGNOSIS — M54.17 LUMBOSACRAL RADICULOPATHY: ICD-10-CM

## 2024-08-21 PROCEDURE — 81003 URINALYSIS AUTO W/O SCOPE: CPT | Performed by: NURSE PRACTITIONER

## 2024-08-21 PROCEDURE — 3044F HG A1C LEVEL LT 7.0%: CPT | Performed by: NURSE PRACTITIONER

## 2024-08-21 PROCEDURE — 99214 OFFICE O/P EST MOD 30 MIN: CPT | Performed by: NURSE PRACTITIONER

## 2024-08-21 RX ORDER — PREDNISONE 20 MG/1
TABLET ORAL
Qty: 11 TABLET | Refills: 0 | Status: SHIPPED | OUTPATIENT
Start: 2024-08-21

## 2024-08-21 ASSESSMENT — PATIENT HEALTH QUESTIONNAIRE - PHQ9
SUM OF ALL RESPONSES TO PHQ QUESTIONS 1-9: 0
2. FEELING DOWN, DEPRESSED OR HOPELESS: NOT AT ALL
SUM OF ALL RESPONSES TO PHQ QUESTIONS 1-9: 0
SUM OF ALL RESPONSES TO PHQ9 QUESTIONS 1 & 2: 0
1. LITTLE INTEREST OR PLEASURE IN DOING THINGS: NOT AT ALL
SUM OF ALL RESPONSES TO PHQ QUESTIONS 1-9: 0
SUM OF ALL RESPONSES TO PHQ QUESTIONS 1-9: 0

## 2024-08-21 NOTE — PROGRESS NOTES
ASSESSMENT and PLAN    1. Right groin pain  -     AMB POC URINALYSIS DIP STICK AUTO W/O MICRO; Future  -     predniSONE (DELTASONE) 20 MG tablet; Take 2 tablets with food by mouth every morning for 4 days, then 1 tablet with food by mouth every morning for 3 days then stop. Start 8/22/24, Disp-11 tablet, R-0Normal  -     [facility] Therapy, [facility]  2. Right hip pain  -     XR HIP 2-3 VW W PELVIS RIGHT; Future  -     predniSONE (DELTASONE) 20 MG tablet; Take 2 tablets with food by mouth every morning for 4 days, then 1 tablet with food by mouth every morning for 3 days then stop. Start 8/22/24, Disp-11 tablet, R-0Normal  -     [facility] Therapy, [facility]    Etiology is unclear but can include not limited to muscular strain hernia, AVN, arthritis, malignancy, etc.  We will order for hip x-ray to rule out any acute causes.  Likely will need to have a MRI to be completed.  He also has radiculopathy symptoms from hip down to his foot.  Could be lumbar radiculopathy of etiology.      We will also order x-ray imaging of his low back for eval.  Meanwhile, we will have him continue conservative care including stretching exercises for hip and back.  Rx for prednisone course. Reviewed risks and side effects of medication including increased GI upset, increased risks for cardiac disease, flushing, hyperactivity, irritability, agitation, insomnia. Patient was advised to take medication with food and to take early in the day. Pt was also advised to stay hydrated.  Advised patient to avoid other oral NSAIDs, aside for Tylenol, such as ibuprofen, Advil, Motrin, Excedrin, BC powder or Goody powder, naproxen while taking prednisone due to increased risk for GI bleed.     He can continue with tizanidine as needed at bedtime for muscle relaxant.  Referral made to physical therapy for consideration.    3. Lumbosacral radiculopathy  -     XR LUMBAR SPINE (MIN 4 VIEWS); Future  -

## 2024-08-22 LAB
BILIRUBIN, URINE, POC: NEGATIVE
BLOOD URINE, POC: NEGATIVE
GLUCOSE URINE, POC: ABNORMAL
KETONES, URINE, POC: NEGATIVE
LEUKOCYTE ESTERASE, URINE, POC: NEGATIVE
NITRITE, URINE, POC: NEGATIVE
PH, URINE, POC: 7 (ref 4.6–8)
PROTEIN,URINE, POC: NEGATIVE
SPECIFIC GRAVITY, URINE, POC: 1.02 (ref 1–1.03)
URINALYSIS CLARITY, POC: CLEAR
URINALYSIS COLOR, POC: YELLOW
UROBILINOGEN, POC: ABNORMAL

## 2024-09-06 ENCOUNTER — TELEPHONE (OUTPATIENT)
Dept: FAMILY MEDICINE CLINIC | Facility: CLINIC | Age: 64
End: 2024-09-06

## 2024-09-06 DIAGNOSIS — M25.551 RIGHT HIP PAIN: ICD-10-CM

## 2024-09-06 DIAGNOSIS — M54.17 LUMBOSACRAL RADICULOPATHY: Primary | ICD-10-CM

## 2024-09-11 ENCOUNTER — OFFICE VISIT (OUTPATIENT)
Age: 64
End: 2024-09-11

## 2024-09-11 VITALS — HEIGHT: 68 IN | BODY MASS INDEX: 33.16 KG/M2 | WEIGHT: 218.8 LBS

## 2024-09-11 DIAGNOSIS — M25.551 PAIN OF RIGHT HIP: Primary | ICD-10-CM

## 2024-09-11 RX ORDER — METHOCARBAMOL 750 MG/1
750 TABLET, FILM COATED ORAL 3 TIMES DAILY PRN
Qty: 30 TABLET | Refills: 0 | Status: SHIPPED | OUTPATIENT
Start: 2024-09-11 | End: 2024-09-21

## 2024-09-20 ENCOUNTER — HOSPITAL ENCOUNTER (OUTPATIENT)
Dept: PHYSICAL THERAPY | Age: 64
Setting detail: RECURRING SERIES
Discharge: HOME | End: 2024-09-23
Payer: COMMERCIAL

## 2024-09-20 DIAGNOSIS — M54.59 OTHER LOW BACK PAIN: ICD-10-CM

## 2024-09-20 DIAGNOSIS — M25.551 PAIN IN RIGHT HIP: Primary | ICD-10-CM

## 2024-09-20 PROCEDURE — 97162 PT EVAL MOD COMPLEX 30 MIN: CPT

## 2024-09-20 PROCEDURE — 97110 THERAPEUTIC EXERCISES: CPT

## 2024-09-30 ENCOUNTER — HOSPITAL ENCOUNTER (OUTPATIENT)
Dept: PHYSICAL THERAPY | Age: 64
Setting detail: RECURRING SERIES
End: 2024-09-30
Payer: COMMERCIAL

## 2024-10-01 ENCOUNTER — OFFICE VISIT (OUTPATIENT)
Dept: ORTHOPEDIC SURGERY | Age: 64
End: 2024-10-01
Payer: COMMERCIAL

## 2024-10-01 DIAGNOSIS — M25.551 PAIN OF RIGHT HIP: Primary | ICD-10-CM

## 2024-10-01 DIAGNOSIS — M16.11 PRIMARY OSTEOARTHRITIS OF RIGHT HIP: ICD-10-CM

## 2024-10-01 PROCEDURE — 20611 DRAIN/INJ JOINT/BURSA W/US: CPT | Performed by: STUDENT IN AN ORGANIZED HEALTH CARE EDUCATION/TRAINING PROGRAM

## 2024-10-01 PROCEDURE — 99203 OFFICE O/P NEW LOW 30 MIN: CPT | Performed by: STUDENT IN AN ORGANIZED HEALTH CARE EDUCATION/TRAINING PROGRAM

## 2024-10-01 RX ORDER — METHYLPREDNISOLONE ACETATE 40 MG/ML
40 INJECTION, SUSPENSION INTRA-ARTICULAR; INTRALESIONAL; INTRAMUSCULAR; SOFT TISSUE ONCE
Status: COMPLETED | OUTPATIENT
Start: 2024-10-01 | End: 2024-10-01

## 2024-10-01 RX ADMIN — METHYLPREDNISOLONE ACETATE 40 MG: 40 INJECTION, SUSPENSION INTRA-ARTICULAR; INTRALESIONAL; INTRAMUSCULAR; SOFT TISSUE at 16:10

## 2024-10-03 ENCOUNTER — APPOINTMENT (OUTPATIENT)
Dept: PHYSICAL THERAPY | Age: 64
End: 2024-10-03
Payer: COMMERCIAL

## 2024-10-08 ENCOUNTER — HOSPITAL ENCOUNTER (OUTPATIENT)
Dept: PHYSICAL THERAPY | Age: 64
Setting detail: RECURRING SERIES
Discharge: HOME | End: 2024-10-11
Payer: COMMERCIAL

## 2024-10-08 PROCEDURE — 97110 THERAPEUTIC EXERCISES: CPT

## 2024-10-08 PROCEDURE — 97112 NEUROMUSCULAR REEDUCATION: CPT

## 2024-10-08 NOTE — PROGRESS NOTES
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)  Secondary:  [facility] @ [place] [address] [address]  Phone: [phone]  Fax: [phone] Plan Frequency: 2x a week for up 90 days (potentially 1x a week for up to 90 days)  Plan of Care/Certification Expiration Date: 24        Plan of Care/Certification Expiration Date:  Plan of Care/Certification Expiration Date: 24    Frequency/Duration: Plan Frequency: 2x a week for up 90 days (potentially 1x a week for up to 90 days)      Time In/Out:   Time In: 1544  Time Out: 1626      PT Visit Info:    Progress Note Counter: 2      Visit Count:  2    OUTPATIENT PHYSICAL THERAPY:   Treatment Note 10/8/2024       Episode  (RLE hip pain/LBP)               Treatment Diagnosis:    Pain in right hip  Other low back pain  Medical/Referring Diagnosis:    Right groin pain  Right hip pain  Lumbosacral radiculopathy      Referring Physician:  Kaye Sher, APRN - CNP  MD Orders:  PT Eval and Treat   Return MD Appt:     Date of Onset:  No data recorded   Allergies:   Patient has no known allergies.  Restrictions/Precautions:   None      Interventions Planned (Treatment may consist of any combination of the following):     See Assessment Note    Subjective Comments:   Pt reports with BLE hip soreness, some groin pain     Initial Pain Level: 4/10  Post Session Pain Level:  2/10  Medications Last Reviewed:  10/8/2024  Updated Objective Findings:  None Today  Treatment   THERAPEUTIC EXERCISE: (10 minutes):    Exercises per grid below to improve mobility, strength, balance and coordination.  Required visual, verbal, manual and tactile cues to promote proper body alignment, promote proper body posture, promote proper body mechanics and promote proper body breathing techniques.  Progressed resistance, range, repetitions and complexity of movement as indicated.    THERAPEUTIC ACTIVITY: (

## 2024-10-11 ENCOUNTER — HOSPITAL ENCOUNTER (OUTPATIENT)
Dept: PHYSICAL THERAPY | Age: 64
Setting detail: RECURRING SERIES
Discharge: HOME | End: 2024-10-14
Payer: COMMERCIAL

## 2024-10-11 PROCEDURE — 97110 THERAPEUTIC EXERCISES: CPT

## 2024-10-11 PROCEDURE — 97112 NEUROMUSCULAR REEDUCATION: CPT

## 2024-10-11 NOTE — PROGRESS NOTES
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)  Secondary:  [facility] @ [place] [address] [address]  Phone: [phone]  Fax: [phone] Plan Frequency: 2x a week for up 90 days (potentially 1x a week for up to 90 days)  Plan of Care/Certification Expiration Date: 24        Plan of Care/Certification Expiration Date:  Plan of Care/Certification Expiration Date: 24    Frequency/Duration: Plan Frequency: 2x a week for up 90 days (potentially 1x a week for up to 90 days)      Time In/Out:   Time In: 1536  Time Out: 1620      PT Visit Info:    Progress Note Counter: 3      Visit Count:  3    OUTPATIENT PHYSICAL THERAPY:   Treatment Note 10/11/2024       Episode  (RLE hip pain/LBP)               Treatment Diagnosis:    Pain in right hip  Other low back pain  Medical/Referring Diagnosis:    Right groin pain  Right hip pain  Lumbosacral radiculopathy      Referring Physician:  Kaye Sher, APRN - CNP  MD Orders:  PT Eval and Treat   Return MD Appt:     Date of Onset:  No data recorded   Allergies:   Patient has no known allergies.  Restrictions/Precautions:   None      Interventions Planned (Treatment may consist of any combination of the following):     See Assessment Note    Subjective Comments:   Pt reports with BLE hip soreness, some groin pain, good improvement     Initial Pain Level: 3/10  Post Session Pain Level:  2/10  Medications Last Reviewed:  10/11/2024  Updated Objective Findings:  None Today  Treatment   THERAPEUTIC EXERCISE: (10 minutes):    Exercises per grid below to improve mobility, strength, balance and coordination.  Required visual, verbal, manual and tactile cues to promote proper body alignment, promote proper body posture, promote proper body mechanics and promote proper body breathing techniques.  Progressed resistance, range, repetitions and complexity of movement as

## 2024-10-15 ENCOUNTER — HOSPITAL ENCOUNTER (OUTPATIENT)
Dept: PHYSICAL THERAPY | Age: 64
Setting detail: RECURRING SERIES
Discharge: HOME | End: 2024-10-18
Payer: COMMERCIAL

## 2024-10-15 PROCEDURE — 97110 THERAPEUTIC EXERCISES: CPT

## 2024-10-15 PROCEDURE — 97112 NEUROMUSCULAR REEDUCATION: CPT

## 2024-10-15 NOTE — PROGRESS NOTES
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)  Secondary:  [facility] @ [place] [address] [address]  Phone: [phone]  Fax: [phone] Plan Frequency: 2x a week for up 90 days (potentially 1x a week for up to 90 days)  Plan of Care/Certification Expiration Date: 24        Plan of Care/Certification Expiration Date:  Plan of Care/Certification Expiration Date: 24    Frequency/Duration: Plan Frequency: 2x a week for up 90 days (potentially 1x a week for up to 90 days)      Time In/Out:   Time In: 1539  Time Out: 1620      PT Visit Info:    Progress Note Counter: 4      Visit Count:  4    OUTPATIENT PHYSICAL THERAPY:   Treatment Note 10/15/2024       Episode  (RLE hip pain/LBP)               Treatment Diagnosis:    Pain in right hip  Other low back pain  Medical/Referring Diagnosis:    Right groin pain  Right hip pain  Lumbosacral radiculopathy    Referring Physician:  Kaye Sher, SHERIF - CNP  MD Orders:  PT Eval and Treat   Return MD Appt:     Date of Onset:  No data recorded   Allergies:   Patient has no known allergies.  Restrictions/Precautions:   None      Interventions Planned (Treatment may consist of any combination of the following):     See Assessment Note    Subjective Comments:   Pt reports with good improvement with ROM and strength in the BLE hip complex's     Initial Pain Level: 3/10  Post Session Pain Level:  2/10  Medications Last Reviewed:  10/15/2024  Updated Objective Findings:  None Today  Treatment   THERAPEUTIC EXERCISE: (10 minutes):    Exercises per grid below to improve mobility, strength, balance and coordination.  Required visual, verbal, manual and tactile cues to promote proper body alignment, promote proper body posture, promote proper body mechanics and promote proper body breathing techniques.  Progressed resistance, range, repetitions and complexity of movement as

## 2024-10-18 ENCOUNTER — HOSPITAL ENCOUNTER (OUTPATIENT)
Dept: PHYSICAL THERAPY | Age: 64
Setting detail: RECURRING SERIES
Discharge: HOME | End: 2024-10-21
Payer: COMMERCIAL

## 2024-10-18 PROCEDURE — 97112 NEUROMUSCULAR REEDUCATION: CPT

## 2024-10-18 PROCEDURE — 97110 THERAPEUTIC EXERCISES: CPT

## 2024-10-18 NOTE — PROGRESS NOTES
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)  Secondary:  [facility] @ [place] [address] [address]  Phone: [phone]  Fax: [phone] Plan Frequency: 2x a week for up 90 days (potentially 1x a week for up to 90 days)  Plan of Care/Certification Expiration Date: 24        Plan of Care/Certification Expiration Date:  Plan of Care/Certification Expiration Date: 24    Frequency/Duration: Plan Frequency: 2x a week for up 90 days (potentially 1x a week for up to 90 days)      Time In/Out:   Time In: 1528  Time Out: 1624      PT Visit Info:    Progress Note Counter: 5      Visit Count:  5    OUTPATIENT PHYSICAL THERAPY:   Treatment Note 10/18/2024       Episode  (RLE hip pain/LBP)               Treatment Diagnosis:    Pain in right hip  Other low back pain  Medical/Referring Diagnosis:    Right groin pain  Right hip pain  Lumbosacral radiculopathy    Referring Physician:  Kaye Sher, SHERIF - CNP  MD Orders:  PT Eval and Treat   Return MD Appt:     Date of Onset:  No data recorded   Allergies:   Patient has no known allergies.  Restrictions/Precautions:   None      Interventions Planned (Treatment may consist of any combination of the following):     See Assessment Note    Subjective Comments:   Pt reports with good improvement with ROM and strength in the BLE hip complex's     Initial Pain Level: 3/10  Post Session Pain Level:  2/10  Medications Last Reviewed:  10/18/2024  Updated Objective Findings:  None Today  Treatment   THERAPEUTIC EXERCISE: (10 minutes):    Exercises per grid below to improve mobility, strength, balance and coordination.  Required visual, verbal, manual and tactile cues to promote proper body alignment, promote proper body posture, promote proper body mechanics and promote proper body breathing techniques.  Progressed resistance, range, repetitions and complexity of movement as

## 2024-10-21 ENCOUNTER — HOSPITAL ENCOUNTER (OUTPATIENT)
Dept: PHYSICAL THERAPY | Age: 64
Setting detail: RECURRING SERIES
Discharge: HOME | End: 2024-10-24
Payer: COMMERCIAL

## 2024-10-21 PROCEDURE — 97112 NEUROMUSCULAR REEDUCATION: CPT

## 2024-10-21 PROCEDURE — 97110 THERAPEUTIC EXERCISES: CPT

## 2024-10-21 NOTE — PROGRESS NOTES
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)  Secondary:  [facility] @ [place] [address] [address]  Phone: [phone]  Fax: [phone] Plan Frequency: 2x a week for up 90 days (potentially 1x a week for up to 90 days)  Plan of Care/Certification Expiration Date: 24        Plan of Care/Certification Expiration Date:  Plan of Care/Certification Expiration Date: 24    Frequency/Duration: Plan Frequency: 2x a week for up 90 days (potentially 1x a week for up to 90 days)      Time In/Out:   Time In: 1532  Time Out: 1615      PT Visit Info:    Progress Note Counter: 6      Visit Count:  6    OUTPATIENT PHYSICAL THERAPY:   Treatment Note 10/21/2024       Episode  (RLE hip pain/LBP)               Treatment Diagnosis:    Pain in right hip  Other low back pain  Medical/Referring Diagnosis:    Right groin pain  Right hip pain  Lumbosacral radiculopathy    Referring Physician:  Kaye Sher, APRN - CNP  MD Orders:  PT Eval and Treat   Return MD Appt:     Date of Onset:  No data recorded   Allergies:   Patient has no known allergies.  Restrictions/Precautions:   None      Interventions Planned (Treatment may consist of any combination of the following):     See Assessment Note    Subjective Comments:   Pt reports with good improvement with BLE hip pain     Initial Pain Level: 3/10  Post Session Pain Level:  2/10  Medications Last Reviewed:  10/21/2024  Updated Objective Findings:  None Today  Treatment   THERAPEUTIC EXERCISE: (10 minutes):    Exercises per grid below to improve mobility, strength, balance and coordination.  Required visual, verbal, manual and tactile cues to promote proper body alignment, promote proper body posture, promote proper body mechanics and promote proper body breathing techniques.  Progressed resistance, range, repetitions and complexity of movement as indicated.    THERAPEUTIC ACTIVITY: (

## 2024-10-22 ENCOUNTER — OFFICE VISIT (OUTPATIENT)
Age: 64
End: 2024-10-22
Payer: COMMERCIAL

## 2024-10-22 DIAGNOSIS — M25.551 PAIN OF RIGHT HIP: Primary | ICD-10-CM

## 2024-10-22 PROCEDURE — 99213 OFFICE O/P EST LOW 20 MIN: CPT | Performed by: ORTHOPAEDIC SURGERY

## 2024-10-22 NOTE — PROGRESS NOTES
Name: Bird Palencia  YOB: 1960  Gender: male  MRN: [id number]  Age: 64 y.o.      Chief Complaint: Injection follow-up    History of Present Illness:      This is a very pleasant 64 y.o. male who presents with a history of back pain and right groin and right leg pain.  He states he got some relief from his hip injection.  He thinks the physical therapy is helping him the most.      Medications:       Current Outpatient Medications:     predniSONE (DELTASONE) 20 MG tablet, Take 2 tablets with food by mouth every morning for 4 days, then 1 tablet with food by mouth every morning for 3 days then stop. Start 8/22/24, Disp: 11 tablet, Rfl: 0    rosuvastatin (CRESTOR) 10 MG tablet, Take 1 tablet by mouth at bedtime For cholesterol, Disp: 90 tablet, Rfl: 3    naproxen (NAPROSYN) 500 MG tablet, TAKE 1 TABLET BY MOUTH TWICE A DAY WITH MEALS as needed for pain, Disp: 180 tablet, Rfl: 3    glimepiride (AMARYL) 4 MG tablet, Take 1 tablet by mouth daily (with breakfast) For diabetes, Disp: 90 tablet, Rfl: 3    ezetimibe (ZETIA) 10 MG tablet, Take 1 tablet by mouth daily For cholesterol, Disp: 90 tablet, Rfl: 3    empagliflozin (JARDIANCE) 25 MG tablet, Take 1 tablet by mouth daily In the morning for diabetes, Disp: 90 tablet, Rfl: 3    tiZANidine (ZANAFLEX) 4 MG tablet, Take 1/2 to 1 tablet orally at bedtime as needed for muscle spasms. May cause drowsiness and dizziness, Disp: 90 tablet, Rfl: 3    Lancets MISC, Take BS as directed BID. E11.9, Disp: , Rfl:     fluticasone (FLONASE) 50 MCG/ACT nasal spray, 2 sprays by Nasal route daily, Disp: , Rfl:     Allergies:    No Known Allergies      Physical Exam:     This is a well developed well nourished male adult in no acute distress.     Mood and affect are appropriate.    Oriented to person, place, and time.    Respirations are unlabored and there is no evidence of cyanosis    The patient ambulates with an normal gait.     There is minimal hip irritability

## 2024-10-23 ENCOUNTER — HOSPITAL ENCOUNTER (OUTPATIENT)
Dept: PHYSICAL THERAPY | Age: 64
Setting detail: RECURRING SERIES
Discharge: HOME | End: 2024-10-26
Payer: COMMERCIAL

## 2024-10-23 PROCEDURE — 97110 THERAPEUTIC EXERCISES: CPT

## 2024-10-23 NOTE — PROGRESS NOTES
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)  Secondary:  [facility] @ [place] [address] [address]  Phone: [phone]  Fax: [phone] Plan Frequency: 2x a week for up 90 days (potentially 1x a week for up to 90 days)  Plan of Care/Certification Expiration Date: 24        Plan of Care/Certification Expiration Date:  Plan of Care/Certification Expiration Date: 24    Frequency/Duration: Plan Frequency: 2x a week for up 90 days (potentially 1x a week for up to 90 days)      Time In/Out:   Time In: 1517  Time Out: 1601  1517 - 2 minutes late    PT Visit Info:    Progress Note Counter: 7      Visit Count:  7    OUTPATIENT PHYSICAL THERAPY:   Treatment Note 10/23/2024       Episode  (RLE hip pain/LBP)               Treatment Diagnosis:    Pain in right hip  Other low back pain  Medical/Referring Diagnosis:    Right groin pain  Right hip pain  Lumbosacral radiculopathy    Referring Physician:  Kaye Sher, APRN - CNP  MD Orders:  PT Eval and Treat   Return MD Appt:     Date of Onset:  No data recorded   Allergies:   Patient has no known allergies.  Restrictions/Precautions:   None      Interventions Planned (Treatment may consist of any combination of the following):     See Assessment Note    Subjective Comments:   Pt reports he was off for 5 days and went back to work today.     Initial Pain Level: 8/10  Post Session Pain Level:  3/10  Medications Last Reviewed:  10/23/2024  Updated Objective Findings:  None Today  Treatment   THERAPEUTIC EXERCISE: (43 minutes):    Exercises per grid below to improve mobility, strength, balance and coordination.  Required visual, verbal, manual and tactile cues to promote proper body alignment, promote proper body posture, promote proper body mechanics and promote proper body breathing techniques.  Progressed resistance, range, repetitions and complexity of movement as

## 2024-10-30 ENCOUNTER — HOSPITAL ENCOUNTER (OUTPATIENT)
Dept: PHYSICAL THERAPY | Age: 64
Setting detail: RECURRING SERIES
Discharge: HOME | End: 2024-11-02
Payer: COMMERCIAL

## 2024-10-30 PROCEDURE — 97110 THERAPEUTIC EXERCISES: CPT

## 2024-10-30 NOTE — PROGRESS NOTES
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)  Secondary:  [facility] @ [place] [address] [address]  Phone: [phone]  Fax: [phone] Plan Frequency: 2x a week for up 90 days (potentially 1x a week for up to 90 days)  Plan of Care/Certification Expiration Date: 24        Plan of Care/Certification Expiration Date:  Plan of Care/Certification Expiration Date: 24    Frequency/Duration: Plan Frequency: 2x a week for up 90 days (potentially 1x a week for up to 90 days)      Time In/Out:   Time In: 1523 (patient 8 minutes late)  Time Out: 1604     PT Visit Info:    Progress Note Counter: 8      Visit Count:  8    OUTPATIENT PHYSICAL THERAPY:   Treatment Note 10/30/2024       Episode  (RLE hip pain/LBP)               Treatment Diagnosis:    Pain in right hip  Other low back pain  Medical/Referring Diagnosis:    Right groin pain  Right hip pain  Lumbosacral radiculopathy    Referring Physician:  Kaye Sher, APRHOSSEIN - CNP  MD Orders:  PT Eval and Treat   Return MD Appt:     Date of Onset:  No data recorded   Allergies:   Patient has no known allergies.  Restrictions/Precautions:   None      Interventions Planned (Treatment may consist of any combination of the following):     See Assessment Note    Subjective Comments:   Patient reports that back and R hip have a little bit of pain today.  Did walk on Treadmill 15 minutes the other day and rode bike about 30 minutes on Monday.     Initial Pain Level: 3-4/10  Post Session Pain Level:  3/10  Medications Last Reviewed:  10/30/2024  Updated Objective Findings:  None Today  Treatment   THERAPEUTIC EXERCISE: (40 minutes):    Exercises per grid below to improve mobility, strength, balance and coordination.  Required visual, verbal, manual and tactile cues to promote proper body alignment, promote proper body posture, promote proper body mechanics and promote proper body

## 2024-11-04 ENCOUNTER — HOSPITAL ENCOUNTER (OUTPATIENT)
Dept: PHYSICAL THERAPY | Age: 64
Setting detail: RECURRING SERIES
Discharge: HOME | End: 2024-11-07
Payer: COMMERCIAL

## 2024-11-04 PROCEDURE — 97112 NEUROMUSCULAR REEDUCATION: CPT

## 2024-11-04 PROCEDURE — 97110 THERAPEUTIC EXERCISES: CPT

## 2024-11-04 NOTE — PROGRESS NOTES
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)  Secondary:  [facility] @ [place] [address] [address]  Phone: [phone]  Fax: [phone] Plan Frequency: 2x a week for up 90 days (potentially 1x a week for up to 90 days)  Plan of Care/Certification Expiration Date: 24        Plan of Care/Certification Expiration Date:  Plan of Care/Certification Expiration Date: 24    Frequency/Duration: Plan Frequency: 2x a week for up 90 days (potentially 1x a week for up to 90 days)      Time In/Out:   Time In: 1545  Time Out: 1625     PT Visit Info:    Progress Note Counter: 9      Visit Count:  9    OUTPATIENT PHYSICAL THERAPY:   Treatment Note 2024       Episode  (RLE hip pain/LBP)               Treatment Diagnosis:    Pain in right hip  Other low back pain  Medical/Referring Diagnosis:    Right groin pain  Right hip pain  Lumbosacral radiculopathy    Referring Physician:  Kaye Sher, APRHOSSEIN - CNP  MD Orders:  PT Eval and Treat   Return MD Appt:     Date of Onset:  No data recorded   Allergies:   Patient has no known allergies.  Restrictions/Precautions:   None      Interventions Planned (Treatment may consist of any combination of the following):     See Assessment Note    Subjective Comments:   Patient reports some minor pain in the R hip groin region     Initial Pain Level: 3-4/10  Post Session Pain Level:  3/10  Medications Last Reviewed:  2024  Updated Objective Findings:  None Today  Treatment   THERAPEUTIC EXERCISE: (10 minutes):    Exercises per grid below to improve mobility, strength, balance and coordination.  Required visual, verbal, manual and tactile cues to promote proper body alignment, promote proper body posture, promote proper body mechanics and promote proper body breathing techniques.  Progressed resistance, range, repetitions and complexity of movement as indicated.    THERAPEUTIC ACTIVITY:

## 2024-11-11 ENCOUNTER — HOSPITAL ENCOUNTER (OUTPATIENT)
Dept: PHYSICAL THERAPY | Age: 64
Setting detail: RECURRING SERIES
Discharge: HOME | End: 2024-11-14
Payer: COMMERCIAL

## 2024-11-11 PROCEDURE — 97112 NEUROMUSCULAR REEDUCATION: CPT

## 2024-11-11 PROCEDURE — 97110 THERAPEUTIC EXERCISES: CPT

## 2024-11-11 NOTE — PROGRESS NOTES
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)  Secondary:  [facility] @ [place] [address] [address]  Phone: [phone]  Fax: [phone] Plan Frequency: 2x a week for up 90 days (potentially 1x a week for up to 90 days)  Plan of Care/Certification Expiration Date: 24        Plan of Care/Certification Expiration Date:  Plan of Care/Certification Expiration Date: 24    Frequency/Duration: Plan Frequency: 2x a week for up 90 days (potentially 1x a week for up to 90 days)      Time In/Out:   Time In: 1541  Time Out: 1624     PT Visit Info:    Progress Note Counter: 10      Visit Count:  10    OUTPATIENT PHYSICAL THERAPY:   Treatment Note 2024       Episode  (RLE hip pain/LBP)               Treatment Diagnosis:    Pain in right hip  Other low back pain  Medical/Referring Diagnosis:    Right groin pain  Right hip pain  Lumbosacral radiculopathy    Referring Physician:  Kaye Sher, SHERIF - CNP  MD Orders:  PT Eval and Treat   Return MD Appt:     Date of Onset:  No data recorded   Allergies:   Patient has no known allergies.  Restrictions/Precautions:   None      Interventions Planned (Treatment may consist of any combination of the following):     See Assessment Note    Subjective Comments:   Patient reports some minor pain in the R hip groin region     Initial Pain Level: 3-4/10  Post Session Pain Level:  3/10  Medications Last Reviewed:  2024  Updated Objective Findings:  None Today  Treatment   THERAPEUTIC EXERCISE: (10 minutes):    Exercises per grid below to improve mobility, strength, balance and coordination.  Required visual, verbal, manual and tactile cues to promote proper body alignment, promote proper body posture, promote proper body mechanics and promote proper body breathing techniques.  Progressed resistance, range, repetitions and complexity of movement as indicated.    THERAPEUTIC

## 2024-11-11 NOTE — PLAN OF CARE
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)  Secondary:  [facility] @ [place] [address] [address]  Phone: [phone]  Fax: [phone] Plan Frequency: 2x a week for up 90 days (potentially 1x a week for up to 90 days)    Plan of Care/Certification Expiration Date: 24        Plan of Care/Certification Expiration Date:  Plan of Care/Certification Expiration Date: 24    Frequency/Duration: Plan Frequency: 2x a week for up 90 days (potentially 1x a week for up to 90 days)      Time In/Out:   Time In: 1541      PT Visit Info:    Progress Note Counter: 10      Visit Count:  10                OUTPATIENT PHYSICAL THERAPY:             Progress Report 2024               Episode (RLE hip pain/LBP)         Treatment Diagnosis:     Pain in right hip  Other low back pain  Medical/Referring Diagnosis:    Right groin pain  Right hip pain  Lumbosacral radiculopathy      Referring Physician:  Kaye Sher APRN - CNP  MD Orders:  PT Eval and Treat   Return MD Appt:    Date of Onset:      Allergies:  Patient has no known allergies.  Restrictions/Precautions:    None      Medications Last Reviewed:  2024     SUBJECTIVE   History of Injury/Illness (Reason for Referral):  Pt arrives to the [facility] clinic w/ RLE hip pain and LBP. Pt reports that his pain is worse at night and reports increased soreness and pain in the RLE hip and lumbar spine. Pt reports that this started insidiously about 2 months ago. Pt reports he was pretty active back in the day and reports now his physical activity is diminished. 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 Pt reports that he can work his job but it still gives him a whole lot of pain when working. Pt reports that his ADLs are minimally-moderately affected and reports that he can do most activities but still has to take to complete tasks. Pt reports

## 2024-11-18 ENCOUNTER — HOSPITAL ENCOUNTER (OUTPATIENT)
Dept: PHYSICAL THERAPY | Age: 64
Setting detail: RECURRING SERIES
Discharge: HOME | End: 2024-11-21
Payer: COMMERCIAL

## 2024-11-18 PROCEDURE — 97110 THERAPEUTIC EXERCISES: CPT

## 2024-11-18 PROCEDURE — 97112 NEUROMUSCULAR REEDUCATION: CPT

## 2024-11-18 NOTE — PROGRESS NOTES
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)  Secondary:  [facility] @ [place] [address] [address]  Phone: [phone]  Fax: [phone] Plan Frequency: 2x a week for up 90 days (potentially 1x a week for up to 90 days)  Plan of Care/Certification Expiration Date: 24        Plan of Care/Certification Expiration Date:  Plan of Care/Certification Expiration Date: 24    Frequency/Duration: Plan Frequency: 2x a week for up 90 days (potentially 1x a week for up to 90 days)      Time In/Out:   Time In: 1536  Time Out: 1621     PT Visit Info:    Progress Note Counter: 1      Visit Count:  11    OUTPATIENT PHYSICAL THERAPY:   Treatment Note 2024       Episode  (RLE hip pain/LBP)               Treatment Diagnosis:    Pain in right hip  Other low back pain  Medical/Referring Diagnosis:    Right groin pain  Right hip pain  Lumbosacral radiculopathy    Referring Physician:  Kaye Sher, SHERIF - CNP  MD Orders:  PT Eval and Treat   Return MD Appt:     Date of Onset:  No data recorded   Allergies:   Patient has no known allergies.  Restrictions/Precautions:   None      Interventions Planned (Treatment may consist of any combination of the following):     See Assessment Note    Subjective Comments:   Patient reports some minor pain in the R hip groin region     Initial Pain Level: 3-4/10  Post Session Pain Level:  3/10  Medications Last Reviewed:  2024  Updated Objective Findings:  None Today  Treatment   THERAPEUTIC EXERCISE: (10 minutes):    Exercises per grid below to improve mobility, strength, balance and coordination.  Required visual, verbal, manual and tactile cues to promote proper body alignment, promote proper body posture, promote proper body mechanics and promote proper body breathing techniques.  Progressed resistance, range, repetitions and complexity of movement as indicated.    THERAPEUTIC

## 2024-11-20 ENCOUNTER — OFFICE VISIT (OUTPATIENT)
Age: 64
End: 2024-11-20
Payer: COMMERCIAL

## 2024-11-20 DIAGNOSIS — M16.11 ARTHRITIS OF RIGHT HIP: Primary | ICD-10-CM

## 2024-11-20 DIAGNOSIS — M54.16 LUMBAR RADICULOPATHY: ICD-10-CM

## 2024-11-20 PROCEDURE — 99213 OFFICE O/P EST LOW 20 MIN: CPT | Performed by: ORTHOPAEDIC SURGERY

## 2024-11-20 NOTE — PROGRESS NOTES
Name: Bird Palencia  YOB: 1960  Gender: male  MRN: [id number]  Age: 64 y.o.      Chief Complaint: Follow-up    History of Present Illness:      This is a very pleasant 64 y.o. male who presents with a right hip pain as well as pain shooting down his right leg.  He underwent a right hip intra-articular injection which did provide relief.  He states his right hip hurts with activity as well as weightbearing.  He also states that he gets pain that radiates down the lateral aspect of his leg to the top of his foot.  Currently it is his hip pain that is bothering him the most.  He is in physical therapy which has helped with his back pain.        Medications:       Current Outpatient Medications:     predniSONE (DELTASONE) 20 MG tablet, Take 2 tablets with food by mouth every morning for 4 days, then 1 tablet with food by mouth every morning for 3 days then stop. Start 8/22/24, Disp: 11 tablet, Rfl: 0    rosuvastatin (CRESTOR) 10 MG tablet, Take 1 tablet by mouth at bedtime For cholesterol, Disp: 90 tablet, Rfl: 3    naproxen (NAPROSYN) 500 MG tablet, TAKE 1 TABLET BY MOUTH TWICE A DAY WITH MEALS as needed for pain, Disp: 180 tablet, Rfl: 3    glimepiride (AMARYL) 4 MG tablet, Take 1 tablet by mouth daily (with breakfast) For diabetes, Disp: 90 tablet, Rfl: 3    ezetimibe (ZETIA) 10 MG tablet, Take 1 tablet by mouth daily For cholesterol, Disp: 90 tablet, Rfl: 3    empagliflozin (JARDIANCE) 25 MG tablet, Take 1 tablet by mouth daily In the morning for diabetes, Disp: 90 tablet, Rfl: 3    tiZANidine (ZANAFLEX) 4 MG tablet, Take 1/2 to 1 tablet orally at bedtime as needed for muscle spasms. May cause drowsiness and dizziness, Disp: 90 tablet, Rfl: 3    Lancets MISC, Take BS as directed BID. E11.9, Disp: , Rfl:     fluticasone (FLONASE) 50 MCG/ACT nasal spray, 2 sprays by Nasal route daily, Disp: , Rfl:     Allergies:    No Known Allergies      Physical Exam:     This is a well developed well

## 2024-11-25 ENCOUNTER — HOSPITAL ENCOUNTER (OUTPATIENT)
Dept: PHYSICAL THERAPY | Age: 64
Setting detail: RECURRING SERIES
Discharge: HOME | End: 2024-11-28
Payer: COMMERCIAL

## 2024-11-25 PROCEDURE — 97110 THERAPEUTIC EXERCISES: CPT

## 2024-11-25 PROCEDURE — 97112 NEUROMUSCULAR REEDUCATION: CPT

## 2024-11-25 NOTE — PROGRESS NOTES
Therapeutic activities per grid below to improve mobility, strength, balance and coordination.Required visual, verbal, manual and tactile cues to promote motor control of bilateral, lower extremity(s).    NEUROMUSCULAR RE-EDUCATION: (27 minutes):    Exercise/activities per grid below to improve balance, coordination, kinesthetic sense, posture and proprioception.  Required visual, verbal, manual and tactile cues to promote motor control of bilateral, lower extremity(s).    MANUAL THERAPY: (5 minutes):   Joint mobilization, Soft tissue mobilization, Manipulation and Manual lymphatic drainage was utilized and necessary because of the patient's restricted joint motion, painful spasm, loss of articular motion and restricted motion of soft tissue.   + Supine RLE long axis distraction grades 2-3   + supine posterior-inferior glides grades 2-3       MODALITIES: ( minutes):         Therapy in order to provide analgesia, relieve muscle spasm and reduce inflammation and edema.             Date:11/4/24   Date:11/18/24   Date:11/25/24     Activity/Exercise      Pt ed on biomechanics, PT POC, HEP, outcomes, prognosis, BLE strengthening education, BLE ROM education, WB progressions/dynamic strengthening education, red flag s/s of soft tissue/labral rupture education         [employer] skilled trunk reciprocation, BLE Strengthening, BLE ROM and CV endurance/stamina training  10 minutes 10 minutes 10 minutes   Supine bridges cues for deep core activation, posterior pelvic tilts, BLE hip extensor activation   BTB  5 x 15 sec holds  #10    BTB / 8#    5 x 15 sec holds  #12 BTB  5 x 15 sec holds  #10    BTB / 8#    5 x 15 sec holds  #12 BTB  5 x 15 sec holds  #10    BTB / 8#    5 x 15 sec holds  #12     Clamshells cues for BLE hip ER with forward stacking, cues for deep core activation, neutral spine alignment throughout AROM   X 40 BTB       3 x 20 sec holds with bolster in between legs max end range ER  X 40 BTB       3 x 20 sec

## 2024-11-26 DIAGNOSIS — M54.16 LUMBAR RADICULOPATHY: Primary | ICD-10-CM

## 2024-12-02 ENCOUNTER — HOSPITAL ENCOUNTER (OUTPATIENT)
Dept: PHYSICAL THERAPY | Age: 64
Setting detail: RECURRING SERIES
Discharge: HOME | End: 2024-12-05
Payer: COMMERCIAL

## 2024-12-02 PROCEDURE — 97112 NEUROMUSCULAR REEDUCATION: CPT

## 2024-12-02 PROCEDURE — 97110 THERAPEUTIC EXERCISES: CPT

## 2024-12-02 NOTE — PROGRESS NOTES
3 x 20 sec holds with bolster in between legs max end range ER  X 40 BTB       3 x 20 sec holds with bolster in between legs max end range ER  X 40 BTB       3 x 20 sec holds with bolster in between legs max end range ER  X 40 BTB       3 x 20 sec holds with bolster in between legs max end range ER      Reverse Clamshells cues for BLE hip ER with forward stacking, cues for deep core activation, neutral spine alignment throughout AROM    X 30 GTB foam roll in between  X 30 GTB foam roll in between  X 30 GTB foam roll in between    Supine SLR hamstring stretch  2 x 60 sec   Hold B 2 x 60 sec   Hold B 2 x 60 sec   Hold B 2 x 60 sec   Hold B   Supine figure four stretch  1 x 60 sec hold w/ piriformis and hip capsule  3 x 30 sec hold B   Piriformis and hip capsule B 3 x 30 sec hold B   Piriformis and hip capsule B 3 x 30 sec hold B   Piriformis and hip capsule B   Supine fallout TKE, ER, cues for true femoral ER, BLE hip flexion inhibition  BTB  x 30   Yoga block between legs BTB  x 30   Yoga block between legs BTB  x 30   Yoga block between legs BTB  x 30   Yoga block between legs   DKTC with green ball  2 x 10 B  Cues for control       Bridge with ball  2 x 10       L sidelying, EXT-ABD-ER respectively, emphasis on release of TFL/glute med           Treatment/Session Summary:    Treatment Assessment:   Pt tolerated tx well, pt was re-evaluated for ITBS and pt looks to have excessive compression on the lateral compartment of the RLE hip. Pt did better with ART techniques of the R TFL/Glut medius. Pt does have hip mobility deficits and still reports anterior groin at times. Pt reports that STM in the lateral musculature is helping. Will monitor and progress prn.     Communication/Consultation:  None today  Equipment provided today:  None  Recommendations/Intent for next treatment session: Next visit will focus on BLE strengthening, BLE ROM and WB progressions.    >Total Treatment Billable Duration:  42 minutes

## 2024-12-04 ENCOUNTER — OFFICE VISIT (OUTPATIENT)
Dept: ORTHOPEDIC SURGERY | Age: 64
End: 2024-12-04
Payer: COMMERCIAL

## 2024-12-04 DIAGNOSIS — M54.16 LUMBAR RADICULOPATHY: Primary | ICD-10-CM

## 2024-12-04 PROCEDURE — 64484 NJX AA&/STRD TFRM EPI L/S EA: CPT | Performed by: PHYSICAL MEDICINE & REHABILITATION

## 2024-12-04 PROCEDURE — 64483 NJX AA&/STRD TFRM EPI L/S 1: CPT | Performed by: PHYSICAL MEDICINE & REHABILITATION

## 2024-12-04 RX ORDER — TRIAMCINOLONE ACETONIDE 40 MG/ML
40 INJECTION, SUSPENSION INTRA-ARTICULAR; INTRAMUSCULAR ONCE
Status: COMPLETED | OUTPATIENT
Start: 2024-12-04 | End: 2024-12-04

## 2024-12-04 RX ADMIN — TRIAMCINOLONE ACETONIDE 40 MG: 40 INJECTION, SUSPENSION INTRA-ARTICULAR; INTRAMUSCULAR at 10:28

## 2024-12-04 NOTE — PROGRESS NOTES
Name: Bird Palencia  YOB: 1960  Gender: male  MRN: [id number]        Transforaminal JM Procedure Note    Procedure: Right  L4-L5 and L5-S1 transforaminal epidural steroid injections     Precautions: Bird Palencia denies prior sensitivity to steroid, local anesthetic, iodine, or shellfish.       Consent:  Consent was obtained prior to the procedure. The procedure was discussed at length with Bird Palencia. He was given the opportunity to ask questions regarding the procedure and its associated risks.  In addition to the potential risks associated with the procedure itself, the patient was informed both verbally and in writing of potential side effects of the use glucocorticoids.  The patient appeared to comprehend the informed consent and desired to have the procedure performed, and informed consent was signed.     He was placed in a prone position on the fluoroscopy table and the skin was prepped and draped in a routine sterile fashion. The areas to be injected were each anesthetized with 1 ml of 1% Lidocaine. A 22 gauge 3.5 inch spinal needle was carefully advanced under fluoroscopic guidance to the right L5-S1 transforaminal space  0.5 ml of 70% of Omnipaque was injected to confirm proper needle placement and absence of subdural or vascular flow Once proper placement was confirmed, 0.5ml of 0.25 marcaine and 40 mg kenalog were injected through the spinal needle.       The above procedure was then repeated at the Right  L4-L5 transforaminal space.    Fluoroscopic guidance was used intermittently over a 10-minute period to insure proper needle placement and his safety. A hard copy of the fluoroscopic image has been placed in his chart and is saved on the C-arm hard drive. He was monitored for 30 minutes after the procedure and discharged home in a stable fashion with a routine follow up.    Procedural Diagnosis:     ICD-10-CM    1. Lumbar radiculopathy  M54.16 FL

## 2024-12-09 ENCOUNTER — LAB (OUTPATIENT)
Dept: FAMILY MEDICINE CLINIC | Facility: CLINIC | Age: 64
End: 2024-12-09
Payer: COMMERCIAL

## 2024-12-09 DIAGNOSIS — Z00.00 ROUTINE GENERAL MEDICAL EXAMINATION AT A HEALTH CARE FACILITY: ICD-10-CM

## 2024-12-09 DIAGNOSIS — E11.9 TYPE 2 DIABETES MELLITUS WITHOUT COMPLICATION, WITHOUT LONG-TERM CURRENT USE OF INSULIN (HCC): ICD-10-CM

## 2024-12-09 DIAGNOSIS — E55.9 VITAMIN D DEFICIENCY, UNSPECIFIED: ICD-10-CM

## 2024-12-09 LAB
25(OH)D3 SERPL-MCNC: 40.2 NG/ML (ref 30–100)
ALBUMIN SERPL-MCNC: 4.2 G/DL (ref 3.2–4.6)
ALBUMIN/GLOB SERPL: 1.3 (ref 1–1.9)
ALP SERPL-CCNC: 87 U/L (ref 40–129)
ALT SERPL-CCNC: 47 U/L (ref 8–55)
ANION GAP SERPL CALC-SCNC: 9 MMOL/L (ref 7–16)
AST SERPL-CCNC: 29 U/L (ref 15–37)
BASOPHILS # BLD: 0 K/UL (ref 0–0.2)
BASOPHILS NFR BLD: 0 % (ref 0–2)
BILIRUB SERPL-MCNC: 1.1 MG/DL (ref 0–1.2)
BILIRUBIN, URINE, POC: NEGATIVE
BLOOD URINE, POC: NEGATIVE
BUN SERPL-MCNC: 19 MG/DL (ref 8–23)
CALCIUM SERPL-MCNC: 10 MG/DL (ref 8.8–10.2)
CHLORIDE SERPL-SCNC: 100 MMOL/L (ref 98–107)
CHOLEST SERPL-MCNC: 125 MG/DL (ref 0–200)
CO2 SERPL-SCNC: 28 MMOL/L (ref 20–29)
CREAT SERPL-MCNC: 0.93 MG/DL (ref 0.8–1.3)
CREAT UR-MCNC: 133 MG/DL (ref 39–259)
DIFFERENTIAL METHOD BLD: ABNORMAL
EOSINOPHIL # BLD: 0 K/UL (ref 0–0.8)
EOSINOPHIL NFR BLD: 0 % (ref 0.5–7.8)
ERYTHROCYTE [DISTWIDTH] IN BLOOD BY AUTOMATED COUNT: 14.4 % (ref 11.9–14.6)
EST. AVERAGE GLUCOSE BLD GHB EST-MCNC: 173 MG/DL
GLOBULIN SER CALC-MCNC: 3.2 G/DL (ref 2.3–3.5)
GLUCOSE SERPL-MCNC: 121 MG/DL (ref 70–99)
GLUCOSE URINE, POC: ABNORMAL
HBA1C MFR BLD: 7.7 % (ref 0–5.6)
HCT VFR BLD AUTO: 50.1 % (ref 41.1–50.3)
HDLC SERPL-MCNC: 37 MG/DL (ref 40–60)
HDLC SERPL: 3.3 (ref 0–5)
HGB BLD-MCNC: 16.2 G/DL (ref 13.6–17.2)
IMM GRANULOCYTES # BLD AUTO: 0 K/UL (ref 0–0.5)
IMM GRANULOCYTES NFR BLD AUTO: 0 % (ref 0–5)
KETONES, URINE, POC: NEGATIVE
LDLC SERPL CALC-MCNC: 70 MG/DL (ref 0–100)
LEUKOCYTE ESTERASE, URINE, POC: NEGATIVE
LYMPHOCYTES # BLD: 2.7 K/UL (ref 0.5–4.6)
LYMPHOCYTES NFR BLD: 28 % (ref 13–44)
MCH RBC QN AUTO: 29.5 PG (ref 26.1–32.9)
MCHC RBC AUTO-ENTMCNC: 32.3 G/DL (ref 31.4–35)
MCV RBC AUTO: 91.3 FL (ref 82–102)
MICROALBUMIN UR-MCNC: <1.2 MG/DL (ref 0–20)
MICROALBUMIN/CREAT UR-RTO: NORMAL MG/G (ref 0–30)
MONOCYTES # BLD: 0.9 K/UL (ref 0.1–1.3)
MONOCYTES NFR BLD: 9 % (ref 4–12)
NEUTS SEG # BLD: 6.2 K/UL (ref 1.7–8.2)
NEUTS SEG NFR BLD: 63 % (ref 43–78)
NITRITE, URINE, POC: NEGATIVE
NRBC # BLD: 0 K/UL (ref 0–0.2)
PH, URINE, POC: 6 (ref 4.6–8)
PLATELET # BLD AUTO: 207 K/UL (ref 150–450)
PMV BLD AUTO: 10 FL (ref 9.4–12.3)
POTASSIUM SERPL-SCNC: 4.8 MMOL/L (ref 3.5–5.1)
PROT SERPL-MCNC: 7.5 G/DL (ref 6.3–8.2)
PROTEIN,URINE, POC: NEGATIVE
PSA SERPL-MCNC: 0.7 NG/ML (ref 0–4)
RBC # BLD AUTO: 5.49 M/UL (ref 4.23–5.6)
SODIUM SERPL-SCNC: 137 MMOL/L (ref 136–145)
SPECIFIC GRAVITY, URINE, POC: 1.02 (ref 1–1.03)
TRIGL SERPL-MCNC: 87 MG/DL (ref 0–150)
TSH W FREE THYROID IF ABNORMAL: 1.3 UIU/ML (ref 0.27–4.2)
URINALYSIS CLARITY, POC: CLEAR
URINALYSIS COLOR, POC: YELLOW
UROBILINOGEN, POC: NORMAL
VLDLC SERPL CALC-MCNC: 17 MG/DL (ref 6–23)
WBC # BLD AUTO: 9.8 K/UL (ref 4.3–11.1)

## 2024-12-09 PROCEDURE — 36415 COLL VENOUS BLD VENIPUNCTURE: CPT | Performed by: NURSE PRACTITIONER

## 2024-12-09 PROCEDURE — 81003 URINALYSIS AUTO W/O SCOPE: CPT | Performed by: NURSE PRACTITIONER

## 2024-12-10 ENCOUNTER — APPOINTMENT (OUTPATIENT)
Dept: PHYSICAL THERAPY | Age: 64
End: 2024-12-10
Payer: COMMERCIAL

## 2024-12-11 NOTE — THERAPY RECERTIFICATION
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)  Secondary:  [facility] @ [place] [address] [address]  Phone: [phone]  Fax: [phone] Plan Frequency: 2x a week for up 90 days (potentially 1x a week for up to 90 days)    Plan of Care/Certification Expiration Date: 25        Plan of Care/Certification Expiration Date:  Plan of Care/Certification Expiration Date: 25    Frequency/Duration: Plan Frequency: 2x a week for up 90 days (potentially 1x a week for up to 90 days)      Time In/Out:   Time In: 1544  Time Out: 1626      PT Visit Info:    Progress Note Counter: 4      Visit Count:  14                OUTPATIENT PHYSICAL THERAPY:             Recertification 2024               Episode (RLE hip pain/LBP)         Treatment Diagnosis:     Pain in right hip  Other low back pain  Medical/Referring Diagnosis:    Right groin pain  Right hip pain  Lumbosacral radiculopathy      Referring Physician:  Kaye Sher, SHERIF - CNP  MD Orders:  PT Eval and Treat   Return MD Appt:    Date of Onset:      Allergies:  Patient has no known allergies.  Restrictions/Precautions:    None      Medications Last Reviewed:  2024     SUBJECTIVE   History of Injury/Illness (Reason for Referral):  Pt arrives to the OPPT clinic w/ RLE hip pain and LBP. Pt reports that his pain is worse at night and reports increased soreness and pain in the RLE hip and lumbar spine. Pt reports that this started insidiously about 2 months ago. Pt reports he was pretty active back in the day and reports now his physical activity is diminished. 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 Pt reports that he can work his job but it still gives him a whole lot of pain when working. Pt reports that his ADLs are minimally-moderately affected and reports that he can do most activities but still has to take to complete tasks.

## 2024-12-16 ENCOUNTER — HOSPITAL ENCOUNTER (OUTPATIENT)
Dept: PHYSICAL THERAPY | Age: 64
Setting detail: RECURRING SERIES
Discharge: HOME | End: 2024-12-19
Payer: COMMERCIAL

## 2024-12-16 PROCEDURE — 97110 THERAPEUTIC EXERCISES: CPT

## 2024-12-16 PROCEDURE — 97112 NEUROMUSCULAR REEDUCATION: CPT

## 2024-12-16 NOTE — PROGRESS NOTES
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)  Secondary:  [facility] @ [place] [address] [address]  Phone: [phone]  Fax: [phone] Plan Frequency: 2x a week for up 90 days (potentially 1x a week for up to 90 days)  Plan of Care/Certification Expiration Date: 25        Plan of Care/Certification Expiration Date:  Plan of Care/Certification Expiration Date: 25    Frequency/Duration: Plan Frequency: 2x a week for up 90 days (potentially 1x a week for up to 90 days)      Time In/Out:   Time In: 1544  Time Out: 1626     PT Visit Info:    Progress Note Counter: 4      Visit Count:  14    OUTPATIENT PHYSICAL THERAPY:   Treatment Note 2024       Episode  (RLE hip pain/LBP)               Treatment Diagnosis:    Pain in right hip  Other low back pain  Medical/Referring Diagnosis:    Right groin pain  Right hip pain  Lumbosacral radiculopathy    Referring Physician:  Kaye Sher, SHERIF - CNP  MD Orders:  PT Eval and Treat   Return MD Appt:     Date of Onset:  No data recorded   Allergies:   Patient has no known allergies.  Restrictions/Precautions:   None      Interventions Planned (Treatment may consist of any combination of the following):     See Assessment Note    Subjective Comments:   Patient reports some minor pain in the R hip groin region     Initial Pain Level: 3-4/10  Post Session Pain Level:  3/10  Medications Last Reviewed:  2024  Updated Objective Findings:  None Today  Treatment   THERAPEUTIC EXERCISE: (10 minutes):    Exercises per grid below to improve mobility, strength, balance and coordination.  Required visual, verbal, manual and tactile cues to promote proper body alignment, promote proper body posture, promote proper body mechanics and promote proper body breathing techniques.  Progressed resistance, range, repetitions and complexity of movement as indicated.    THERAPEUTIC

## 2024-12-17 ENCOUNTER — TELEPHONE (OUTPATIENT)
Dept: ORTHOPEDIC SURGERY | Age: 64
End: 2024-12-17

## 2024-12-17 ENCOUNTER — OFFICE VISIT (OUTPATIENT)
Dept: ORTHOPEDIC SURGERY | Age: 64
End: 2024-12-17
Payer: COMMERCIAL

## 2024-12-17 VITALS — BODY MASS INDEX: 32.71 KG/M2 | HEIGHT: 68 IN | WEIGHT: 215.8 LBS

## 2024-12-17 DIAGNOSIS — M25.551 PAIN OF RIGHT HIP: Primary | ICD-10-CM

## 2024-12-17 DIAGNOSIS — M16.11 ARTHRITIS OF RIGHT HIP: ICD-10-CM

## 2024-12-17 DIAGNOSIS — M16.11 PRIMARY OSTEOARTHRITIS OF RIGHT HIP: ICD-10-CM

## 2024-12-17 PROCEDURE — 99204 OFFICE O/P NEW MOD 45 MIN: CPT | Performed by: ORTHOPAEDIC SURGERY

## 2024-12-17 NOTE — PROGRESS NOTES
Patient ID:    Bird Palencia  [id number]  64 y.o.  1960    Today: December 17, 2024          Chief Complaint: Right hip pain      Patient returns to office today with chief complaint of right hip pain.  The pain is predominately localized to the anterior groin and is described as a general ache with occasional sharp pain.  They rate the pain ranging from 3-8 on the pain scale occurring in a cyclical fashion with periods of acute exacerbation. Symptoms are exacerbated with getting into and out of their vehicle, crossing their legs, and attempting to put on socks/shoes. No significant pain noted with laying on the affected hip. No numbness of tingling going down the extremity.  He has difficulty sleeping.  He does have associated low back pain with altered sensation down his leg into his foot.  He had both intra-articular injections with gave him minimal relief on October 1 by Dr. Keyes as well as L4 5 injections in his back which did give him some temporary relief.  He takes naproxen daily.  He is on no blood thinners he lives with his wife he is non-smoker.  He has diabetes however his hemoglobin A1c is 7.7      Past Medical History:  Past Medical History:   Diagnosis Date    H/O appendicitis     H/O hernia repair     History of cellulitis     Hyperlipidemia     managed with meds    Prediabetes     Type 2 diabetes mellitus without complication, without long-term current use of insulin (HCC)      type ll, does not chk glucose (borderline) last A1C 6.6    Visit for dental examination 03/22/2017       Past Surgical History:  Past Surgical History:   Procedure Laterality Date    APPENDECTOMY  1984    COLONOSCOPY      COLONOSCOPY N/A 10/9/2020    COLONOSCOPY/ BMI 32 performed by Benito Parson MD at [facility] ENDOSCOPY    COLONOSCOPY FLX DX W/COLLJ SPEC WHEN PFRMD  10/9/2020         HERNIA REPAIR  2004    [facility]    SHOULDER ARTHROSCOPY Right 2019        Medications:     Prior to Admission medications

## 2024-12-18 SDOH — ECONOMIC STABILITY: FOOD INSECURITY: WITHIN THE PAST 12 MONTHS, YOU WORRIED THAT YOUR FOOD WOULD RUN OUT BEFORE YOU GOT MONEY TO BUY MORE.: SOMETIMES TRUE

## 2024-12-18 SDOH — ECONOMIC STABILITY: INCOME INSECURITY: HOW HARD IS IT FOR YOU TO PAY FOR THE VERY BASICS LIKE FOOD, HOUSING, MEDICAL CARE, AND HEATING?: NOT HARD AT ALL

## 2024-12-18 SDOH — ECONOMIC STABILITY: FOOD INSECURITY: WITHIN THE PAST 12 MONTHS, THE FOOD YOU BOUGHT JUST DIDN'T LAST AND YOU DIDN'T HAVE MONEY TO GET MORE.: SOMETIMES TRUE

## 2024-12-18 SDOH — ECONOMIC STABILITY: TRANSPORTATION INSECURITY
IN THE PAST 12 MONTHS, HAS LACK OF TRANSPORTATION KEPT YOU FROM MEETINGS, WORK, OR FROM GETTING THINGS NEEDED FOR DAILY LIVING?: NO

## 2024-12-20 ENCOUNTER — OFFICE VISIT (OUTPATIENT)
Dept: FAMILY MEDICINE CLINIC | Facility: CLINIC | Age: 64
End: 2024-12-20
Payer: COMMERCIAL

## 2024-12-20 VITALS
BODY MASS INDEX: 32.79 KG/M2 | OXYGEN SATURATION: 98 % | DIASTOLIC BLOOD PRESSURE: 82 MMHG | HEART RATE: 70 BPM | HEIGHT: 68 IN | WEIGHT: 216.36 LBS | SYSTOLIC BLOOD PRESSURE: 138 MMHG

## 2024-12-20 DIAGNOSIS — E55.9 VITAMIN D DEFICIENCY, UNSPECIFIED: ICD-10-CM

## 2024-12-20 DIAGNOSIS — E11.9 TYPE 2 DIABETES MELLITUS WITHOUT COMPLICATION, WITHOUT LONG-TERM CURRENT USE OF INSULIN (HCC): Primary | ICD-10-CM

## 2024-12-20 DIAGNOSIS — E78.5 HYPERLIPIDEMIA LDL GOAL <70: ICD-10-CM

## 2024-12-20 DIAGNOSIS — M25.551 RIGHT HIP PAIN: ICD-10-CM

## 2024-12-20 PROCEDURE — 99214 OFFICE O/P EST MOD 30 MIN: CPT | Performed by: NURSE PRACTITIONER

## 2024-12-20 PROCEDURE — 3051F HG A1C>EQUAL 7.0%<8.0%: CPT | Performed by: NURSE PRACTITIONER

## 2024-12-20 RX ORDER — FLUTICASONE PROPIONATE 50 MCG
2 SPRAY, SUSPENSION (ML) NASAL DAILY
Qty: 16 G | Refills: 5 | Status: SHIPPED | OUTPATIENT
Start: 2024-12-20

## 2024-12-20 RX ORDER — GLIMEPIRIDE 4 MG/1
TABLET ORAL
Qty: 145 TABLET | Refills: 3 | Status: SHIPPED | OUTPATIENT
Start: 2024-12-20

## 2024-12-20 RX ORDER — PANTOPRAZOLE SODIUM 20 MG/1
20 TABLET, DELAYED RELEASE ORAL
Qty: 90 TABLET | Refills: 1 | Status: SHIPPED | OUTPATIENT
Start: 2024-12-20

## 2024-12-20 RX ORDER — CELECOXIB 200 MG/1
200 CAPSULE ORAL 2 TIMES DAILY PRN
Qty: 180 CAPSULE | Refills: 1 | Status: SHIPPED | OUTPATIENT
Start: 2024-12-20

## 2024-12-20 ASSESSMENT — PATIENT HEALTH QUESTIONNAIRE - PHQ9
SUM OF ALL RESPONSES TO PHQ9 QUESTIONS 1 & 2: 0
SUM OF ALL RESPONSES TO PHQ QUESTIONS 1-9: 0
2. FEELING DOWN, DEPRESSED OR HOPELESS: NOT AT ALL
1. LITTLE INTEREST OR PLEASURE IN DOING THINGS: NOT AT ALL
SUM OF ALL RESPONSES TO PHQ QUESTIONS 1-9: 0

## 2024-12-20 ASSESSMENT — ENCOUNTER SYMPTOMS
STRIDOR: 0
EYE REDNESS: 0
APNEA: 0
SHORTNESS OF BREATH: 0
BACK PAIN: 0
EYE DISCHARGE: 0
BLOOD IN STOOL: 0
SINUS PRESSURE: 0
ABDOMINAL DISTENTION: 0
DIARRHEA: 0
COLOR CHANGE: 0
CHEST TIGHTNESS: 0
SORE THROAT: 0
VOMITING: 0
EYE ITCHING: 0
RESPIRATORY NEGATIVE: 1
SINUS PAIN: 0
ABDOMINAL PAIN: 0
ANAL BLEEDING: 0
ALLERGIC/IMMUNOLOGIC NEGATIVE: 1
RHINORRHEA: 0
FACIAL SWELLING: 0
GASTROINTESTINAL NEGATIVE: 1
VOICE CHANGE: 0
CONSTIPATION: 0
TROUBLE SWALLOWING: 0
RECTAL PAIN: 0
WHEEZING: 0
PHOTOPHOBIA: 0
CHOKING: 0
EYES NEGATIVE: 1
EYE PAIN: 0
NAUSEA: 0
COUGH: 0

## 2024-12-20 NOTE — PROGRESS NOTES
\"Have you been to the ER, urgent care clinic since your last visit?  Hospitalized since your last visit?\"    NO    “Have you seen or consulted any other health care providers outside our system since your last visit?”    NO      “Have you had a diabetic eye exam?”    NO     Date of last diabetic eye exam: 11/11/2020

## 2024-12-20 NOTE — PROGRESS NOTES
PROGRESS NOTE    Chief Complaint   Patient presents with    Follow-up    Discuss Labs       SUBJECTIVE:     iBrd Palencia is a very pleasant 64 y.o. male with hx of diabetes and hyperlipidemia  , seen today in office for lab results review and follow-up.  He continues to have right hip discomfort despite receiving multiple injection intra-articularly.  He has also participating with physical therapy for the last 3 months with minimal improvement.  He is now scheduled for a total hip replacement in February.  Otherwise, he also follows orthopedics for his back and has been receiving epidurals that has provide some relief.Patient reports no chest pain, no shortness of breath, no unilateral or focal weakness, no orthopnea or PND.  GI and  review of systems is unremarkable.         Past Medical History, Past Surgical History, Family history, Social History, and Medications were all reviewed with the patient today and updated as necessary.       Current Outpatient Medications   Medication Sig Dispense Refill    glimepiride (AMARYL) 4 MG tablet Take 4 mg with food in the morning and 2 mg (1/2 tablet) at lunch for diabetes 145 tablet 3    celecoxib (CELEBREX) 200 MG capsule Take 1 capsule by mouth 2 times daily as needed for Pain Take with food 180 capsule 1    pantoprazole (PROTONIX) 20 MG tablet Take 1 tablet by mouth every morning (before breakfast) For stomach protection 90 tablet 1    fluticasone (FLONASE) 50 MCG/ACT nasal spray 2 sprays by Nasal route daily 16 g 5    rosuvastatin (CRESTOR) 10 MG tablet Take 1 tablet by mouth at bedtime For cholesterol 90 tablet 3    ezetimibe (ZETIA) 10 MG tablet Take 1 tablet by mouth daily For cholesterol 90 tablet 3    empagliflozin (JARDIANCE) 25 MG tablet Take 1 tablet by mouth daily In the morning for diabetes 90 tablet 3    tiZANidine (ZANAFLEX) 4 MG tablet Take 1/2 to 1 tablet orally at bedtime as needed for muscle spasms. May cause drowsiness and dizziness 90

## 2024-12-31 ENCOUNTER — OFFICE VISIT (OUTPATIENT)
Age: 64
End: 2024-12-31
Payer: COMMERCIAL

## 2024-12-31 DIAGNOSIS — M25.551 PAIN OF RIGHT HIP: Primary | ICD-10-CM

## 2024-12-31 PROCEDURE — 99213 OFFICE O/P EST LOW 20 MIN: CPT | Performed by: ORTHOPAEDIC SURGERY

## 2024-12-31 NOTE — PROGRESS NOTES
Name: Bird Palencia  YOB: 1960  Gender: male  MRN: [id number]  Age: 64 y.o.      Chief Complaint: Injection follow-up    History of Present Illness:      This is a very pleasant 64 y.o. male who presents with a low back pain with radiation into the right leg as well as right groin pain.  His right groin still continues to bother him and he is scheduled for a right total hip replacement.  He recently underwent a right L4-5 and right L5-S1 transforaminal epidural steroid injection which provided relief of his back pain.        Medications:       Current Outpatient Medications:     glimepiride (AMARYL) 4 MG tablet, Take 4 mg with food in the morning and 2 mg (1/2 tablet) at lunch for diabetes, Disp: 145 tablet, Rfl: 3    celecoxib (CELEBREX) 200 MG capsule, Take 1 capsule by mouth 2 times daily as needed for Pain Take with food, Disp: 180 capsule, Rfl: 1    pantoprazole (PROTONIX) 20 MG tablet, Take 1 tablet by mouth every morning (before breakfast) For stomach protection, Disp: 90 tablet, Rfl: 1    fluticasone (FLONASE) 50 MCG/ACT nasal spray, 2 sprays by Nasal route daily, Disp: 16 g, Rfl: 5    rosuvastatin (CRESTOR) 10 MG tablet, Take 1 tablet by mouth at bedtime For cholesterol, Disp: 90 tablet, Rfl: 3    ezetimibe (ZETIA) 10 MG tablet, Take 1 tablet by mouth daily For cholesterol, Disp: 90 tablet, Rfl: 3    empagliflozin (JARDIANCE) 25 MG tablet, Take 1 tablet by mouth daily In the morning for diabetes, Disp: 90 tablet, Rfl: 3    tiZANidine (ZANAFLEX) 4 MG tablet, Take 1/2 to 1 tablet orally at bedtime as needed for muscle spasms. May cause drowsiness and dizziness, Disp: 90 tablet, Rfl: 3    Lancets MISC, Take BS as directed BID. E11.9, Disp: , Rfl:     Allergies:    No Known Allergies      Physical Exam:     This is a well developed well nourished male adult in no acute distress.     Mood and affect are appropriate.    Oriented to person, place, and time.    Respirations are unlabored

## 2025-01-02 ENCOUNTER — HOSPITAL ENCOUNTER (OUTPATIENT)
Dept: PHYSICAL THERAPY | Age: 65
Setting detail: RECURRING SERIES
Discharge: HOME | End: 2025-01-05
Payer: COMMERCIAL

## 2025-01-02 ENCOUNTER — CLINICAL DOCUMENTATION (OUTPATIENT)
Dept: ORTHOPEDIC SURGERY | Age: 65
End: 2025-01-02

## 2025-01-02 PROCEDURE — 97110 THERAPEUTIC EXERCISES: CPT

## 2025-01-02 PROCEDURE — 97112 NEUROMUSCULAR REEDUCATION: CPT

## 2025-01-02 NOTE — PROGRESS NOTES
forward stacking, cues for deep core activation, neutral spine alignment throughout AROM   X 40 BTB       3 x 20 sec holds with bolster in between legs max end range ER  X 40 BTB       3 x 20 sec holds with bolster in between legs max end range ER  X 40 BTB       3 x 20 sec holds with bolster in between legs max end range ER  X 40 BTB       3 x 20 sec holds with bolster in between legs max end range ER  X 40 BTB       3 x 20 sec holds with bolster in between legs max end range ER      Reverse Clamshells cues for BLE hip ER with forward stacking, cues for deep core activation, neutral spine alignment throughout AROM   X 30 GTB foam roll in between  X 30 GTB foam roll in between  X 30 GTB foam roll in between  X 30 GTB foam roll in between  X 30 GTB foam roll in between    Supine SLR hamstring stretch  2 x 60 sec   Hold B 2 x 60 sec   Hold B 2 x 60 sec   Hold B 2 x 60 sec   Hold B 2 x 60 sec   Hold B   Supine figure four stretch  3 x 30 sec hold B   Piriformis and hip capsule B 3 x 30 sec hold B   Piriformis and hip capsule B 3 x 30 sec hold B   Piriformis and hip capsule B 3 x 30 sec hold B   Piriformis and hip capsule B 3 x 30 sec hold B   Piriformis and hip capsule B   Supine fallout TKE, ER, cues for true femoral ER, BLE hip flexion inhibition  BTB  x 30   Yoga block between legs BTB  x 30   Yoga block between legs BTB  x 30   Yoga block between legs BTB  x 30   Yoga block between legs BTB  x 30   Yoga block between legs   DKTC with green ball         Bridge with ball         L sidelying, EXT-ABD-ER respectively, emphasis on release of TFL/glute med            Treatment/Session Summary:    Treatment Assessment:   Pt tolerated tx well, pt reports that he had a consultation for YADI. Pt and MD team thinks this is the best outcome for him. Pt reports that his pain is somewhat controlled. Will progress prn     Communication/Consultation:  None today  Equipment provided today:  None  Recommendations/Intent for next

## 2025-01-08 ENCOUNTER — PREP FOR PROCEDURE (OUTPATIENT)
Dept: ORTHOPEDIC SURGERY | Age: 65
End: 2025-01-08

## 2025-01-08 DIAGNOSIS — M16.11 PRIMARY OSTEOARTHRITIS OF RIGHT HIP: Primary | ICD-10-CM

## 2025-01-08 RX ORDER — SODIUM CHLORIDE 9 MG/ML
INJECTION, SOLUTION INTRAVENOUS PRN
Status: CANCELLED | OUTPATIENT
Start: 2025-01-08

## 2025-01-08 RX ORDER — ACETAMINOPHEN 325 MG/1
1000 TABLET ORAL ONCE
Status: CANCELLED | OUTPATIENT
Start: 2025-01-08 | End: 2025-01-08

## 2025-01-08 RX ORDER — SODIUM CHLORIDE 0.9 % (FLUSH) 0.9 %
5-40 SYRINGE (ML) INJECTION PRN
Status: CANCELLED | OUTPATIENT
Start: 2025-01-08

## 2025-01-08 RX ORDER — SODIUM CHLORIDE 0.9 % (FLUSH) 0.9 %
5-40 SYRINGE (ML) INJECTION EVERY 12 HOURS SCHEDULED
Status: CANCELLED | OUTPATIENT
Start: 2025-01-08

## 2025-01-08 NOTE — H&P
Patient ID:    Bird Palencia  [id number]  64 y.o.  1960    Today: January 8, 2025          Chief Complaint: Right hip pain      Patient returns to office today with chief complaint of right hip pain.  The pain is predominately localized to the anterior groin and is described as a general ache with occasional sharp pain.  They rate the pain ranging from 3-8 on the pain scale occurring in a cyclical fashion with periods of acute exacerbation. Symptoms are exacerbated with getting into and out of their vehicle, crossing their legs, and attempting to put on socks/shoes. No significant pain noted with laying on the affected hip. No numbness of tingling going down the extremity.  He has difficulty sleeping.  He does have associated low back pain with altered sensation down his leg into his foot.  He had both intra-articular injections with gave him minimal relief on October 1 by Dr. Keyes as well as L4 5 injections in his back which did give him some temporary relief.  He takes naproxen daily.  He is on no blood thinners he lives with his wife he is non-smoker.  He has diabetes however his hemoglobin A1c is 7.7      Past Medical History:  Past Medical History:   Diagnosis Date    H/O appendicitis     H/O hernia repair     History of cellulitis     Hyperlipidemia     managed with meds    Prediabetes     Type 2 diabetes mellitus without complication, without long-term current use of insulin (HCC)      type ll, does not chk glucose (borderline) last A1C 6.6    Visit for dental examination 03/22/2017       Past Surgical History:  Past Surgical History:   Procedure Laterality Date    APPENDECTOMY  1984    COLONOSCOPY      COLONOSCOPY N/A 10/9/2020    COLONOSCOPY/ BMI 32 performed by Benito Parson MD at [facility] ENDOSCOPY    COLONOSCOPY FLX DX W/COLLJ SPEC WHEN PFRMD  10/9/2020         HERNIA REPAIR  2004    [facility]    SHOULDER ARTHROSCOPY Right 2019        Medications:     Prior to Admission medications

## 2025-01-16 ENCOUNTER — CLINICAL DOCUMENTATION (OUTPATIENT)
Dept: ORTHOPEDIC SURGERY | Age: 65
End: 2025-01-16

## 2025-01-21 ENCOUNTER — HOSPITAL ENCOUNTER (OUTPATIENT)
Dept: PHYSICAL THERAPY | Age: 65
Setting detail: RECURRING SERIES
End: 2025-01-21
Payer: COMMERCIAL

## 2025-01-21 ENCOUNTER — HOSPITAL ENCOUNTER (OUTPATIENT)
Dept: SURGERY | Age: 65
Discharge: HOME | End: 2025-01-24
Payer: COMMERCIAL

## 2025-01-21 ENCOUNTER — HOSPITAL ENCOUNTER (OUTPATIENT)
Dept: REHABILITATION | Age: 65
Discharge: HOME | End: 2025-01-24
Payer: COMMERCIAL

## 2025-01-21 VITALS
OXYGEN SATURATION: 97 % | TEMPERATURE: 98.6 F | WEIGHT: 212.7 LBS | DIASTOLIC BLOOD PRESSURE: 87 MMHG | SYSTOLIC BLOOD PRESSURE: 151 MMHG | RESPIRATION RATE: 16 BRPM | HEART RATE: 91 BPM | HEIGHT: 68 IN | BODY MASS INDEX: 32.24 KG/M2

## 2025-01-21 DIAGNOSIS — M16.11 PRIMARY OSTEOARTHRITIS OF RIGHT HIP: ICD-10-CM

## 2025-01-21 LAB
ALBUMIN SERPL-MCNC: 3.7 G/DL (ref 3.2–4.6)
ALBUMIN/GLOB SERPL: 1.1 (ref 1–1.9)
ALP SERPL-CCNC: 108 U/L (ref 40–129)
ALT SERPL-CCNC: 67 U/L (ref 8–55)
ANION GAP SERPL CALC-SCNC: 12 MMOL/L (ref 7–16)
AST SERPL-CCNC: 33 U/L (ref 15–37)
BASOPHILS # BLD: 0.02 K/UL (ref 0–0.2)
BASOPHILS NFR BLD: 0.3 % (ref 0–2)
BILIRUB SERPL-MCNC: 0.4 MG/DL (ref 0–1.2)
BUN SERPL-MCNC: 12 MG/DL (ref 8–23)
CALCIUM SERPL-MCNC: 9.4 MG/DL (ref 8.8–10.2)
CHLORIDE SERPL-SCNC: 102 MMOL/L (ref 98–107)
CO2 SERPL-SCNC: 23 MMOL/L (ref 20–29)
CREAT SERPL-MCNC: 0.92 MG/DL (ref 0.8–1.3)
DIFFERENTIAL METHOD BLD: NORMAL
EKG ATRIAL RATE: 73 BPM
EKG DIAGNOSIS: NORMAL
EKG P AXIS: 36 DEGREES
EKG P-R INTERVAL: 196 MS
EKG Q-T INTERVAL: 356 MS
EKG QRS DURATION: 90 MS
EKG QTC CALCULATION (BAZETT): 392 MS
EKG R AXIS: -12 DEGREES
EKG T AXIS: 4 DEGREES
EKG VENTRICULAR RATE: 73 BPM
EOSINOPHIL # BLD: 0.11 K/UL (ref 0–0.8)
EOSINOPHIL NFR BLD: 1.9 % (ref 0.5–7.8)
ERYTHROCYTE [DISTWIDTH] IN BLOOD BY AUTOMATED COUNT: 14.3 % (ref 11.9–14.6)
EST. AVERAGE GLUCOSE BLD GHB EST-MCNC: 172 MG/DL
GLOBULIN SER CALC-MCNC: 3.5 G/DL (ref 2.3–3.5)
GLUCOSE SERPL-MCNC: 168 MG/DL (ref 70–99)
HBA1C MFR BLD: 7.6 % (ref 0–5.6)
HCT VFR BLD AUTO: 49 % (ref 41.1–50.3)
HGB BLD-MCNC: 16.2 G/DL (ref 13.6–17.2)
IMM GRANULOCYTES # BLD AUTO: 0.02 K/UL (ref 0–0.5)
IMM GRANULOCYTES NFR BLD AUTO: 0.3 % (ref 0–5)
INR PPP: 1
LYMPHOCYTES # BLD: 1.94 K/UL (ref 0.5–4.6)
LYMPHOCYTES NFR BLD: 33.4 % (ref 13–44)
MCH RBC QN AUTO: 29.3 PG (ref 26.1–32.9)
MCHC RBC AUTO-ENTMCNC: 33.1 G/DL (ref 31.4–35)
MCV RBC AUTO: 88.6 FL (ref 82–102)
MONOCYTES # BLD: 0.58 K/UL (ref 0.1–1.3)
MONOCYTES NFR BLD: 10 % (ref 4–12)
MRSA DNA SPEC QL NAA+PROBE: NOT DETECTED
NEUTS SEG # BLD: 3.14 K/UL (ref 1.7–8.2)
NEUTS SEG NFR BLD: 54.1 % (ref 43–78)
NRBC # BLD: 0 K/UL (ref 0–0.2)
PLATELET # BLD AUTO: 186 K/UL (ref 150–450)
PMV BLD AUTO: 9.5 FL (ref 9.4–12.3)
POTASSIUM SERPL-SCNC: 4 MMOL/L (ref 3.5–5.1)
PROT SERPL-MCNC: 7.3 G/DL (ref 6.3–8.2)
PROTHROMBIN TIME: 13 SEC (ref 11.3–14.9)
RBC # BLD AUTO: 5.53 M/UL (ref 4.23–5.6)
S AUREUS CPE NOSE QL NAA+PROBE: DETECTED
SODIUM SERPL-SCNC: 137 MMOL/L (ref 136–145)
WBC # BLD AUTO: 5.8 K/UL (ref 4.3–11.1)

## 2025-01-21 PROCEDURE — 93010 ELECTROCARDIOGRAM REPORT: CPT | Performed by: INTERNAL MEDICINE

## 2025-01-21 PROCEDURE — 93005 ELECTROCARDIOGRAM TRACING: CPT

## 2025-01-21 PROCEDURE — 85025 COMPLETE CBC W/AUTO DIFF WBC: CPT

## 2025-01-21 PROCEDURE — 87641 MR-STAPH DNA AMP PROBE: CPT

## 2025-01-21 PROCEDURE — 83036 HEMOGLOBIN GLYCOSYLATED A1C: CPT

## 2025-01-21 PROCEDURE — 80053 COMPREHEN METABOLIC PANEL: CPT

## 2025-01-21 PROCEDURE — 97161 PT EVAL LOW COMPLEX 20 MIN: CPT

## 2025-01-21 PROCEDURE — 94760 N-INVAS EAR/PLS OXIMETRY 1: CPT

## 2025-01-21 PROCEDURE — 85610 PROTHROMBIN TIME: CPT

## 2025-01-21 RX ORDER — ELECTROLYTES/DEXTROSE
1 SOLUTION, ORAL ORAL DAILY
COMMUNITY

## 2025-01-21 RX ORDER — VITAMIN B COMPLEX
1 CAPSULE ORAL DAILY
COMMUNITY

## 2025-01-21 ASSESSMENT — PROMIS GLOBAL HEALTH SCALE
WHO IS THE PERSON COMPLETING THE PROMIS V1.1 SURVEY?: SELF
SUM OF RESPONSES TO QUESTIONS 3, 6, 7, & 8: 18
IN GENERAL, HOW WOULD YOU RATE YOUR MENTAL HEALTH, INCLUDING YOUR MOOD AND YOUR ABILITY TO THINK [ON A SCALE OF 1 (POOR) TO 5 (EXCELLENT)]?: GOOD
IN GENERAL, HOW WOULD YOU RATE YOUR SATISFACTION WITH YOUR SOCIAL ACTIVITIES AND RELATIONSHIPS [ON A SCALE OF 1 (POOR) TO 5 (EXCELLENT)]?: GOOD
HOW IS THE PROMIS V1.1 BEING ADMINISTERED?: PAPER
IN GENERAL, WOULD YOU SAY YOUR QUALITY OF LIFE IS...[ON A SCALE OF 1 (POOR) TO 5 (EXCELLENT)]: GOOD
IN THE PAST 7 DAYS, HOW OFTEN HAVE YOU BEEN BOTHERED BY EMOTIONAL PROBLEMS, SUCH AS FEELING ANXIOUS, DEPRESSED, OR IRRITABLE [ON A SCALE FROM 1 (NEVER) TO 5 (ALWAYS)]?: SOMETIMES
IN GENERAL, PLEASE RATE HOW WELL YOU CARRY OUT YOUR USUAL SOCIAL ACTIVITIES (INCLUDES ACTIVITIES AT HOME, AT WORK, AND IN YOUR COMMUNITY, AND RESPONSIBILITIES AS A PARENT, CHILD, SPOUSE, EMPLOYEE, FRIEND, ETC) [ON A SCALE OF 1 (POOR) TO 5 (EXCELLENT)]?: GOOD
TO WHAT EXTENT ARE YOU ABLE TO CARRY OUT YOUR EVERYDAY PHYSICAL ACTIVITIES SUCH AS WALKING, CLIMBING STAIRS, CARRYING GROCERIES, OR MOVING A CHAIR [ON A SCALE OF 1 (NOT AT ALL) TO 5 (COMPLETELY)]?: MOSTLY
SUM OF RESPONSES TO QUESTIONS 2, 4, 5, & 10: 12
IN GENERAL, WOULD YOU SAY YOUR HEALTH IS...[ON A SCALE OF 1 (POOR) TO 5 (EXCELLENT)]: GOOD
IN THE PAST 7 DAYS, HOW WOULD YOU RATE YOUR FATIGUE ON AVERAGE [ON A SCALE FROM 1 (NONE) TO 5 (VERY SEVERE)]?: MODERATE
IN GENERAL, HOW WOULD YOU RATE YOUR PHYSICAL HEALTH [ON A SCALE OF 1 (POOR) TO 5 (EXCELLENT)]?: GOOD
IN THE PAST 7 DAYS, HOW WOULD YOU RATE YOUR PAIN ON AVERAGE [ON A SCALE FROM 0 (NO PAIN) TO 10 (WORST IMAGINABLE PAIN)]?: 8

## 2025-01-21 ASSESSMENT — HOOS JR
SITTING: SEVERE
RISING FROM SITTING: MODERATE
WALKING ON UNEVEN SURFACE: MODERATE
HOOS JR RAW SCORE: 15
LYING IN BED (TURNING OVER, MAINTAINING HIP POSITION): SEVERE
HOOS JR TOTAL INTERVAL SCORE: 43.335
BENDING TO THE FLOOR TO PICK UP OBJECT: SEVERE
GOING UP OR DOWN STAIRS: MODERATE
HOOS JR RAW SCORE: 15

## 2025-01-21 ASSESSMENT — PAIN DESCRIPTION - ORIENTATION: ORIENTATION: RIGHT;ANTERIOR;INNER

## 2025-01-21 ASSESSMENT — PULMONARY FUNCTION TESTS
FEV1 (LITERS): 2.27
FEV1 (%PREDICTED): 85

## 2025-01-21 ASSESSMENT — PAIN DESCRIPTION - LOCATION: LOCATION: GROIN;HIP

## 2025-01-21 ASSESSMENT — PAIN SCALES - GENERAL: PAINLEVEL_OUTOF10: 9

## 2025-01-21 NOTE — PERIOP NOTE
PLEASE CONTINUE TAKING ALL PRESCRIPTION MEDICATIONS UP TO THE DAY OF SURGERY UNLESS OTHERWISE DIRECTED BELOW.    DISCONTINUE all vitamins, herbals and supplements 3 weeks prior to surgery. DISCONTINUE Non-Steroidal Anti-Inflammatory (NSAIDS) such as Advil, Ibuprofen, Motrin, Naproxen and Aleve 5 days prior to surgery.       Home Medications to take  the day of surgery    Celebrex, fluticasone spray, pantoprazole           Home Medications to Hold- please continue all other medications except these.    Stop Jardiance 3 days prior to surgery (last dose 1/30/25)    Stop vitamins and supplements now      Comments   On the day before surgery please take Acetaminophen 1000mg in the morning and then again before bed. You may substitute for Tylenol 650 mg.      Bring Dynahex wash and Incentive Spirometer with you to hospital on the day of surgery.            Please do not bring home medications with you on the day of surgery unless otherwise directed by your nurse.  If you are instructed to bring home medications, please give them to your nurse as they will be administered by the nursing staff.    If you have any questions, please call [facility] [phone] option [phone].    A copy of this note was provided to the patient for reference.

## 2025-01-21 NOTE — PERIOP NOTE
Patient verified name and .    Order for consent was found in EHR and does match case posting; patient verified.     Type 3 surgery, PAT Joint assessment complete.    Labs per surgeon: CBC,CMP, A1C, PT/INR; results pending.   Labs per anesthesia protocol: no additional lab work needed.   EKG: Done today- within anesthesia guidelines.     MRSA/MSSA swab collected per policy. MD to consult pharmacy to dose Vanc if appropriate.     Hospital approved surgical skin cleanser and instructions to return bottle on DOS given per hospital policy.    Patient provided with handouts including Guide to Surgery, Pain Management, Preventing Surgical Site Infections, and [place] [facility] Brochure.    Patient answered medical/surgical history questions at their best of ability. All prior to admission medications documented in Epic. Original medication prescription bottle was visualized during patient appointment.     Patient instructed to hold all vitamins 3 weeks prior to surgery and NSAIDS 5 days prior to surgery.     Patient teach back successful and patient demonstrates knowledge of instruction.

## 2025-01-21 NOTE — PROGRESS NOTES
25 0804   Treatment   Treatment Type Bedside spirometry   Breath Sounds   Breath Sounds Bilateral Clear   Oxygen Therapy/Pulse Ox   O2 Therapy Room air   Pulse 91   SpO2 97 %   Pulse Oximeter Device Mode Intermittent   $Pulse Oximeter $Spot check (single)   Bedside Spirometry   FEV-1/Actual (Liters) 2.27 Liters   FEV-1/Predicted (Liters) 85 Liters     Initial respiratory Assessment completed with pt. Pt was interviewed and evaluated in Joint camp prior to surgery.  Patient ID:  Bird Palencia  [id number]  64 y.o.  1960  Surgeon:  FAUSTINA  Date of Surgery: [unfilled]2/3/2025  Procedure: Total Right Hip Arthroplasty  Primary Care Physician: Kaye Sher, APRN - [phone]  Specialists:[facility]    Pt taught proper COUGH technique  IS REVIEWED WITH PT AS WELL AS BENEFITS OF USING IS IN SEDENTARY PTS.  DIAPHRAGMATIC BREATHING EXERCISE INSTRUCTIONS GIVEN    History of smokin/2 PPD FOR 20 YEARS                 Quit date:         Secondhand smoke:DENIES    Past procedures with Oxygen desaturation or delayed awakening:DENIES     Respiratory history:DENIES SOB                                YOLA- USES DENTAL DEVICE                                  Respiratory meds:  DENIES    FAMILY PRESENT:             NO     PAST SLEEP STUDY:        YES               2021 AHI 13.3 SUPINE AHI 16.8 REM AHI 51.4 SAT 80%  HX OF YOLA:                        YES                     YOLA assessment:     DANGERS OF UNTREATED YOLA EXPLAINED TO PT.                                          SLEEPS ON SIDE       PHYSICAL EXAM   Body mass index is 32.34 kg/m².   Vitals:    25 0804   BP:    Pulse: (P) 91   Resp:    Temp:    SpO2: (P) 97%     Neck circumference:  45    cm    Loud snoring:                                                 YES           Witnessed apnea or wakening gasping or choking:        APNEA  Awakens with headaches:                                               DENIES  Morning

## 2025-01-21 NOTE — PROGRESS NOTES
Bird Palencia  : 1960  Primary: Umr  Secondary: [facility] [place] [address]  [phone]      Physical Therapy Prehab Evaluation Summary:2025   Time In/Out   PT Charge Capture  Episode     MEDICAL/REFERRING DIAGNOSIS: Unilateral primary osteoarthritis, right hip [M16.11]  REFERRING PHYSICIAN: Dick Birch MD    Treatment Diagnosis:   Pain in Right Hip (M25.551)  Stiffness of Right Hip, Not elsewhere classified (M25.651)    DATE OF SURGERY: 2/3/25  Assessment:   COMMENTS:  Mr. Palencia is present for a Prehab Physical Therapy Assessment for their upcoming right YAID . They are here alone. After discussing the surgical admission options and discharge plans, they are planning on discharging on day of surgery.    He has a spouse whom will offer assist at NE. He has a shower chair and BSC to use at NE. Will need a RW.    PROBLEM LIST:   (Impacting functional limitations):  Mr. Palencia presents with the following lower extremity(s) problems:  Strength  Range of Motion  Home Exercise Program  Pain INTERVENTIONS PLANNED:   (Benefits and precautions of physical therapy have been discussed with the patient.)  Home Exercise Program  Educational Discussion       GOALS: (Goals have been discussed and agreed upon with patient.)  Discharge Goals: Time Frame: 1 Day  Patient will demonstrate independence with a home exercise program designed to increase strength, range of motion, and pain control to minimize functional deficits and optimize patient for total joint replacement.    Subjective:   Past Medical History/Comorbidities:   Mr. Palencia  has a past medical history of H/O appendicitis, H/O hernia repair, History of cellulitis, Hyperlipidemia, YOLA (obstructive sleep apnea), Prediabetes, Type 2 diabetes mellitus without complication, without long-term current use of insulin (HCC), and Visit for dental examination.

## 2025-01-21 NOTE — PERIOP NOTE
ALT elevated; anesthesia to review. All other lab results within anesthesia guidelines, no follow-up required. Labs automatically routed to ordering provider via Epic documentation.

## 2025-01-27 ENCOUNTER — OFFICE VISIT (OUTPATIENT)
Dept: ORTHOPEDIC SURGERY | Age: 65
End: 2025-01-27

## 2025-01-27 VITALS — BODY MASS INDEX: 33.71 KG/M2 | HEIGHT: 68 IN | WEIGHT: 222.4 LBS

## 2025-01-27 DIAGNOSIS — M16.11 PRIMARY OSTEOARTHRITIS OF RIGHT HIP: Primary | ICD-10-CM

## 2025-01-27 PROCEDURE — 99024 POSTOP FOLLOW-UP VISIT: CPT | Performed by: ORTHOPAEDIC SURGERY

## 2025-01-27 RX ORDER — TRAMADOL HYDROCHLORIDE 50 MG/1
50 TABLET ORAL EVERY 6 HOURS PRN
Qty: 28 TABLET | Refills: 0 | Status: SHIPPED | OUTPATIENT
Start: 2025-01-27 | End: 2025-02-03

## 2025-01-27 RX ORDER — ASPIRIN 81 MG/1
81 TABLET ORAL EVERY 12 HOURS
Qty: 70 TABLET | Refills: 0 | Status: SHIPPED | OUTPATIENT
Start: 2025-01-27

## 2025-01-27 RX ORDER — OXYCODONE HYDROCHLORIDE 5 MG/1
5-10 TABLET ORAL EVERY 4 HOURS PRN
Qty: 30 TABLET | Refills: 0 | Status: SHIPPED | OUTPATIENT
Start: 2025-01-27 | End: 2025-02-03

## 2025-01-27 RX ORDER — CELECOXIB 200 MG/1
200 CAPSULE ORAL 2 TIMES DAILY
Qty: 60 CAPSULE | Refills: 1 | Status: SHIPPED | OUTPATIENT
Start: 2025-01-27

## 2025-01-27 RX ORDER — ACETAMINOPHEN 325 MG/1
975 TABLET ORAL EVERY 8 HOURS
Qty: 60 TABLET | Refills: 2 | Status: SHIPPED | OUTPATIENT
Start: 2025-01-27

## 2025-01-27 RX ORDER — SENNA AND DOCUSATE SODIUM 50; 8.6 MG/1; MG/1
1 TABLET, FILM COATED ORAL DAILY
Qty: 30 TABLET | Refills: 1 | Status: SHIPPED | OUTPATIENT
Start: 2025-01-27

## 2025-01-27 NOTE — PROGRESS NOTES
ARTHROSCOPY Right 2019        Medications:     Prior to Admission medications    Medication Sig Start Date End Date Taking? Authorizing Provider   b complex vitamins capsule Take 1 capsule by mouth daily    Kassandra Guy MD   Multiple Vitamin (MULTIVITAMIN ADULT) TABS Take 1 tablet by mouth daily    Kassandra Guy MD   glimepiride (AMARYL) 4 MG tablet Take 4 mg with food in the morning and 2 mg (1/2 tablet) at lunch for diabetes 12/20/24   Kaye Sher APRN - CNP   celecoxib (CELEBREX) 200 MG capsule Take 1 capsule by mouth 2 times daily as needed for Pain Take with food 12/20/24   Kaye Sher APRN - CNP   pantoprazole (PROTONIX) 20 MG tablet Take 1 tablet by mouth every morning (before breakfast) For stomach protection  Patient taking differently: Take 1 tablet by mouth every morning (before breakfast) For stomach protection with celebrex 12/20/24   Kaye Sher APRN - CNP   fluticasone (FLONASE) 50 MCG/ACT nasal spray 2 sprays by Nasal route daily 12/20/24   Kaye Sher APRN - CNP   rosuvastatin (CRESTOR) 10 MG tablet Take 1 tablet by mouth at bedtime For cholesterol 6/18/24   Kaye Sher APRN - CNP   ezetimibe (ZETIA) 10 MG tablet Take 1 tablet by mouth daily For cholesterol 6/18/24   Kaye Sher APRN - CNP   empagliflozin (JARDIANCE) 25 MG tablet Take 1 tablet by mouth daily In the morning for diabetes 6/18/24   Kaye Sher APRN - CNP   tiZANidine (ZANAFLEX) 4 MG tablet Take 1/2 to 1 tablet orally at bedtime as needed for muscle spasms. May cause drowsiness and dizziness 6/18/24   Kaye Sher APRN - CNP   Lancets MISC Take BS as directed BID. E11.9 10/25/21   Automatic Reconciliation, Ar       Family History:     Family History   Problem Relation Age of Onset    Cancer Mother     Cancer Sister         breast    No Known Problems Father        Social History:      Social History     Tobacco Use    Smoking status: Former     Current packs/day: 0.00     Average

## 2025-01-27 NOTE — H&P (VIEW-ONLY)
Patient ID:    Bird Palencia  [id number]  64 y.o.  1960    Today: January 27, 2025          Chief Complaint: Right hip pain      Patient returns to office today with chief complaint of right hip pain.  The pain is predominately localized to the anterior groin and is described as a general ache with occasional sharp pain.  They rate the pain ranging from 3-8 on the pain scale occurring in a cyclical fashion with periods of acute exacerbation. Symptoms are exacerbated with getting into and out of their vehicle, crossing their legs, and attempting to put on socks/shoes. No significant pain noted with laying on the affected hip. No numbness of tingling going down the extremity.  He has difficulty sleeping.  He does have associated low back pain with altered sensation down his leg into his foot.  He had both intra-articular injections with gave him minimal relief on October 1 by Dr. Keyes as well as L4 5 injections in his back which did give him some temporary relief.  He takes naproxen daily.  He is on no blood thinners he lives with his wife he is non-smoker.  He has diabetes however his hemoglobin A1c is 7.7      Past Medical History:  Past Medical History:   Diagnosis Date    H/O appendicitis     H/O hernia repair     History of cellulitis     Hyperlipidemia     managed with meds    YOLA (obstructive sleep apnea)     mouth piece nightly    Prediabetes     Type 2 diabetes mellitus without complication, without long-term current use of insulin (HCC)      type ll, does not chk glucose (borderline) last A1C 6.6, oral meds, denies hypo    Visit for dental examination 03/22/2017       Past Surgical History:  Past Surgical History:   Procedure Laterality Date    APPENDECTOMY  1984    COLONOSCOPY      COLONOSCOPY N/A 10/9/2020    COLONOSCOPY/ BMI 32 performed by Benito Parson MD at [facility] ENDOSCOPY    COLONOSCOPY FLX DX W/COLLJ SPEC WHEN PFRMD  10/9/2020         HERNIA REPAIR  2004    [facility]    SHOULDER

## 2025-02-02 ENCOUNTER — ANESTHESIA EVENT (OUTPATIENT)
Dept: SURGERY | Age: 65
End: 2025-02-02
Payer: COMMERCIAL

## 2025-02-02 RX ORDER — FENTANYL CITRATE 50 UG/ML
100 INJECTION, SOLUTION INTRAMUSCULAR; INTRAVENOUS
Status: CANCELLED | OUTPATIENT
Start: 2025-02-02 | End: 2025-02-03

## 2025-02-02 RX ORDER — MIDAZOLAM HYDROCHLORIDE 2 MG/2ML
2 INJECTION, SOLUTION INTRAMUSCULAR; INTRAVENOUS
Status: CANCELLED | OUTPATIENT
Start: 2025-02-02 | End: 2025-02-03

## 2025-02-03 ENCOUNTER — APPOINTMENT (OUTPATIENT)
Dept: GENERAL RADIOLOGY | Age: 65
End: 2025-02-03
Attending: ORTHOPAEDIC SURGERY
Payer: COMMERCIAL

## 2025-02-03 ENCOUNTER — HOSPITAL ENCOUNTER (OUTPATIENT)
Age: 65
Discharge: HOME HEALTH | End: 2025-02-03
Attending: ORTHOPAEDIC SURGERY | Admitting: ORTHOPAEDIC SURGERY
Payer: COMMERCIAL

## 2025-02-03 ENCOUNTER — ANESTHESIA (OUTPATIENT)
Dept: SURGERY | Age: 65
End: 2025-02-03
Payer: COMMERCIAL

## 2025-02-03 VITALS
BODY MASS INDEX: 32.89 KG/M2 | RESPIRATION RATE: 18 BRPM | DIASTOLIC BLOOD PRESSURE: 84 MMHG | SYSTOLIC BLOOD PRESSURE: 131 MMHG | HEIGHT: 68 IN | TEMPERATURE: 97.3 F | HEART RATE: 62 BPM | OXYGEN SATURATION: 98 % | WEIGHT: 217 LBS

## 2025-02-03 PROBLEM — M16.11 ARTHRITIS OF RIGHT HIP: Status: ACTIVE | Noted: 2025-02-03

## 2025-02-03 LAB
GLUCOSE BLD STRIP.AUTO-MCNC: 144 MG/DL (ref 65–100)
SERVICE CMNT-IMP: ABNORMAL

## 2025-02-03 PROCEDURE — 6360000002 HC RX W HCPCS: Performed by: NURSE ANESTHETIST, CERTIFIED REGISTERED

## 2025-02-03 PROCEDURE — 2720000010 HC SURG SUPPLY STERILE: Performed by: ORTHOPAEDIC SURGERY

## 2025-02-03 PROCEDURE — 2500000003 HC RX 250 WO HCPCS: Performed by: NURSE ANESTHETIST, CERTIFIED REGISTERED

## 2025-02-03 PROCEDURE — 27130 TOTAL HIP ARTHROPLASTY: CPT | Performed by: ORTHOPAEDIC SURGERY

## 2025-02-03 PROCEDURE — 6370000000 HC RX 637 (ALT 250 FOR IP): Performed by: ORTHOPAEDIC SURGERY

## 2025-02-03 PROCEDURE — 97530 THERAPEUTIC ACTIVITIES: CPT

## 2025-02-03 PROCEDURE — 2580000003 HC RX 258: Performed by: ANESTHESIOLOGY

## 2025-02-03 PROCEDURE — 72170 X-RAY EXAM OF PELVIS: CPT

## 2025-02-03 PROCEDURE — 97165 OT EVAL LOW COMPLEX 30 MIN: CPT

## 2025-02-03 PROCEDURE — 97535 SELF CARE MNGMENT TRAINING: CPT

## 2025-02-03 PROCEDURE — 6360000002 HC RX W HCPCS: Performed by: ORTHOPAEDIC SURGERY

## 2025-02-03 PROCEDURE — C1776 JOINT DEVICE (IMPLANTABLE): HCPCS | Performed by: ORTHOPAEDIC SURGERY

## 2025-02-03 PROCEDURE — 6370000000 HC RX 637 (ALT 250 FOR IP): Performed by: ANESTHESIOLOGY

## 2025-02-03 PROCEDURE — 3600000015 HC SURGERY LEVEL 5 ADDTL 15MIN: Performed by: ORTHOPAEDIC SURGERY

## 2025-02-03 PROCEDURE — 7100000000 HC PACU RECOVERY - FIRST 15 MIN: Performed by: ORTHOPAEDIC SURGERY

## 2025-02-03 PROCEDURE — 3600000005 HC SURGERY LEVEL 5 BASE: Performed by: ORTHOPAEDIC SURGERY

## 2025-02-03 PROCEDURE — 3700000000 HC ANESTHESIA ATTENDED CARE: Performed by: ORTHOPAEDIC SURGERY

## 2025-02-03 PROCEDURE — 97161 PT EVAL LOW COMPLEX 20 MIN: CPT

## 2025-02-03 PROCEDURE — 2500000003 HC RX 250 WO HCPCS: Performed by: ORTHOPAEDIC SURGERY

## 2025-02-03 PROCEDURE — 82962 GLUCOSE BLOOD TEST: CPT

## 2025-02-03 PROCEDURE — 3700000001 HC ADD 15 MINUTES (ANESTHESIA): Performed by: ORTHOPAEDIC SURGERY

## 2025-02-03 PROCEDURE — 7100000001 HC PACU RECOVERY - ADDTL 15 MIN: Performed by: ORTHOPAEDIC SURGERY

## 2025-02-03 PROCEDURE — 73501 X-RAY EXAM HIP UNI 1 VIEW: CPT

## 2025-02-03 PROCEDURE — 6360000002 HC RX W HCPCS: Performed by: ANESTHESIOLOGY

## 2025-02-03 PROCEDURE — 2709999900 HC NON-CHARGEABLE SUPPLY: Performed by: ORTHOPAEDIC SURGERY

## 2025-02-03 PROCEDURE — 2580000003 HC RX 258: Performed by: ORTHOPAEDIC SURGERY

## 2025-02-03 PROCEDURE — C1713 ANCHOR/SCREW BN/BN,TIS/BN: HCPCS | Performed by: ORTHOPAEDIC SURGERY

## 2025-02-03 DEVICE — BIOLOX DELTA TS CERAMIC FEMORAL HEAD 12/14 TAPER REVISION DIAMETER 40MM +5
Type: IMPLANTABLE DEVICE | Site: HIP | Status: FUNCTIONAL
Brand: BIOLOX DELTA

## 2025-02-03 DEVICE — HIP H2 TOT ADV OTHER HD IMPL CAPPED SYNTHES: Type: IMPLANTABLE DEVICE | Site: HIP | Status: FUNCTIONAL

## 2025-02-03 DEVICE — PINNACLE CANCELLOUS BONE SCREW 6.5MM X 15MM
Type: IMPLANTABLE DEVICE | Site: HIP | Status: FUNCTIONAL
Brand: PINNACLE

## 2025-02-03 DEVICE — PINNACLE HIP SOLUTIONS ALTRX POLYETHYLENE ACETABULAR LINER NEUTRAL 40MM ID 60MM OD
Type: IMPLANTABLE DEVICE | Site: HIP | Status: FUNCTIONAL
Brand: PINNACLE ALTRX

## 2025-02-03 DEVICE — PINNACLE GRIPTION ACETABULAR SHELL SECTOR 60MM OD
Type: IMPLANTABLE DEVICE | Site: HIP | Status: FUNCTIONAL
Brand: PINNACLE GRIPTION

## 2025-02-03 DEVICE — ACTIS DUOFIX HIP PROSTHESIS (FEMORAL STEM 12/14 TAPER CEMENTLESS SIZE 2 HIGH COLLAR)  CE
Type: IMPLANTABLE DEVICE | Site: HIP | Status: FUNCTIONAL
Brand: ACTIS

## 2025-02-03 DEVICE — PINNACLE CANCELLOUS BONE SCREW 6.5MM X 25MM
Type: IMPLANTABLE DEVICE | Site: HIP | Status: FUNCTIONAL
Brand: PINNACLE

## 2025-02-03 RX ORDER — SODIUM CHLORIDE, SODIUM LACTATE, POTASSIUM CHLORIDE, CALCIUM CHLORIDE 600; 310; 30; 20 MG/100ML; MG/100ML; MG/100ML; MG/100ML
INJECTION, SOLUTION INTRAVENOUS CONTINUOUS
Status: DISCONTINUED | OUTPATIENT
Start: 2025-02-03 | End: 2025-02-03 | Stop reason: HOSPADM

## 2025-02-03 RX ORDER — ASPIRIN 81 MG/1
81 TABLET ORAL 2 TIMES DAILY
Status: DISCONTINUED | OUTPATIENT
Start: 2025-02-03 | End: 2025-02-03 | Stop reason: HOSPADM

## 2025-02-03 RX ORDER — ACETAMINOPHEN 500 MG
1000 TABLET ORAL EVERY 6 HOURS SCHEDULED
Status: DISCONTINUED | OUTPATIENT
Start: 2025-02-03 | End: 2025-02-03 | Stop reason: HOSPADM

## 2025-02-03 RX ORDER — DIPHENHYDRAMINE HYDROCHLORIDE 50 MG/ML
25 INJECTION INTRAMUSCULAR; INTRAVENOUS EVERY 6 HOURS PRN
Status: DISCONTINUED | OUTPATIENT
Start: 2025-02-03 | End: 2025-02-03 | Stop reason: HOSPADM

## 2025-02-03 RX ORDER — TRANEXAMIC ACID 100 MG/ML
INJECTION, SOLUTION INTRAVENOUS
Status: DISCONTINUED | OUTPATIENT
Start: 2025-02-03 | End: 2025-02-03 | Stop reason: SDUPTHER

## 2025-02-03 RX ORDER — LIDOCAINE HYDROCHLORIDE 10 MG/ML
1 INJECTION, SOLUTION INFILTRATION; PERINEURAL
Status: DISCONTINUED | OUTPATIENT
Start: 2025-02-03 | End: 2025-02-03 | Stop reason: HOSPADM

## 2025-02-03 RX ORDER — ONDANSETRON 2 MG/ML
4 INJECTION INTRAMUSCULAR; INTRAVENOUS
Status: DISCONTINUED | OUTPATIENT
Start: 2025-02-03 | End: 2025-02-03 | Stop reason: HOSPADM

## 2025-02-03 RX ORDER — GLYCOPYRROLATE 0.2 MG/ML
INJECTION INTRAMUSCULAR; INTRAVENOUS
Status: DISCONTINUED | OUTPATIENT
Start: 2025-02-03 | End: 2025-02-03 | Stop reason: SDUPTHER

## 2025-02-03 RX ORDER — SODIUM CHLORIDE 0.9 % (FLUSH) 0.9 %
5-40 SYRINGE (ML) INJECTION EVERY 12 HOURS SCHEDULED
Status: DISCONTINUED | OUTPATIENT
Start: 2025-02-03 | End: 2025-02-03 | Stop reason: HOSPADM

## 2025-02-03 RX ORDER — OXYCODONE HYDROCHLORIDE 5 MG/1
10 TABLET ORAL
Status: DISCONTINUED | OUTPATIENT
Start: 2025-02-03 | End: 2025-02-03 | Stop reason: HOSPADM

## 2025-02-03 RX ORDER — ROPIVACAINE HYDROCHLORIDE 2 MG/ML
INJECTION, SOLUTION EPIDURAL; INFILTRATION; PERINEURAL PRN
Status: DISCONTINUED | OUTPATIENT
Start: 2025-02-03 | End: 2025-02-03 | Stop reason: ALTCHOICE

## 2025-02-03 RX ORDER — DIPHENHYDRAMINE HCL 25 MG
25 CAPSULE ORAL EVERY 6 HOURS PRN
Status: DISCONTINUED | OUTPATIENT
Start: 2025-02-03 | End: 2025-02-03 | Stop reason: HOSPADM

## 2025-02-03 RX ORDER — SODIUM CHLORIDE 0.9 % (FLUSH) 0.9 %
5-40 SYRINGE (ML) INJECTION PRN
Status: DISCONTINUED | OUTPATIENT
Start: 2025-02-03 | End: 2025-02-03 | Stop reason: HOSPADM

## 2025-02-03 RX ORDER — CELECOXIB 200 MG/1
200 CAPSULE ORAL ONCE
Status: COMPLETED | OUTPATIENT
Start: 2025-02-03 | End: 2025-02-03

## 2025-02-03 RX ORDER — SODIUM CHLORIDE 9 MG/ML
INJECTION, SOLUTION INTRAVENOUS CONTINUOUS
Status: DISCONTINUED | OUTPATIENT
Start: 2025-02-03 | End: 2025-02-03 | Stop reason: HOSPADM

## 2025-02-03 RX ORDER — SODIUM CHLORIDE 9 MG/ML
INJECTION, SOLUTION INTRAVENOUS PRN
Status: DISCONTINUED | OUTPATIENT
Start: 2025-02-03 | End: 2025-02-03 | Stop reason: HOSPADM

## 2025-02-03 RX ORDER — ONDANSETRON 4 MG/1
4 TABLET, ORALLY DISINTEGRATING ORAL EVERY 8 HOURS PRN
Status: DISCONTINUED | OUTPATIENT
Start: 2025-02-03 | End: 2025-02-03 | Stop reason: HOSPADM

## 2025-02-03 RX ORDER — OXYCODONE HYDROCHLORIDE 5 MG/1
5 TABLET ORAL PRN
Status: DISCONTINUED | OUTPATIENT
Start: 2025-02-03 | End: 2025-02-03 | Stop reason: HOSPADM

## 2025-02-03 RX ORDER — CYCLOBENZAPRINE HCL 10 MG
10 TABLET ORAL EVERY 12 HOURS PRN
Status: DISCONTINUED | OUTPATIENT
Start: 2025-02-03 | End: 2025-02-03 | Stop reason: HOSPADM

## 2025-02-03 RX ORDER — ONDANSETRON 2 MG/ML
INJECTION INTRAMUSCULAR; INTRAVENOUS
Status: DISCONTINUED | OUTPATIENT
Start: 2025-02-03 | End: 2025-02-03 | Stop reason: SDUPTHER

## 2025-02-03 RX ORDER — BISACODYL 5 MG/1
5 TABLET, DELAYED RELEASE ORAL DAILY PRN
Status: DISCONTINUED | OUTPATIENT
Start: 2025-02-03 | End: 2025-02-03 | Stop reason: HOSPADM

## 2025-02-03 RX ORDER — DIPHENHYDRAMINE HYDROCHLORIDE 50 MG/ML
12.5 INJECTION INTRAMUSCULAR; INTRAVENOUS
Status: DISCONTINUED | OUTPATIENT
Start: 2025-02-03 | End: 2025-02-03 | Stop reason: HOSPADM

## 2025-02-03 RX ORDER — KETOROLAC TROMETHAMINE 30 MG/ML
INJECTION, SOLUTION INTRAMUSCULAR; INTRAVENOUS PRN
Status: DISCONTINUED | OUTPATIENT
Start: 2025-02-03 | End: 2025-02-03 | Stop reason: ALTCHOICE

## 2025-02-03 RX ORDER — DEXAMETHASONE SODIUM PHOSPHATE 10 MG/ML
INJECTION, SOLUTION INTRAMUSCULAR; INTRAVENOUS
Status: DISCONTINUED | OUTPATIENT
Start: 2025-02-03 | End: 2025-02-03 | Stop reason: SDUPTHER

## 2025-02-03 RX ORDER — ACETAMINOPHEN 500 MG
1000 TABLET ORAL ONCE
Status: COMPLETED | OUTPATIENT
Start: 2025-02-03 | End: 2025-02-03

## 2025-02-03 RX ORDER — KETOROLAC TROMETHAMINE 30 MG/ML
INJECTION, SOLUTION INTRAMUSCULAR; INTRAVENOUS
Status: DISCONTINUED | OUTPATIENT
Start: 2025-02-03 | End: 2025-02-03 | Stop reason: SDUPTHER

## 2025-02-03 RX ORDER — ACETAMINOPHEN 500 MG
1000 TABLET ORAL ONCE
Status: DISCONTINUED | OUTPATIENT
Start: 2025-02-03 | End: 2025-02-03 | Stop reason: SDUPTHER

## 2025-02-03 RX ORDER — MIDAZOLAM HYDROCHLORIDE 1 MG/ML
INJECTION, SOLUTION INTRAMUSCULAR; INTRAVENOUS
Status: DISCONTINUED | OUTPATIENT
Start: 2025-02-03 | End: 2025-02-03 | Stop reason: SDUPTHER

## 2025-02-03 RX ORDER — KETOROLAC TROMETHAMINE 30 MG/ML
30 INJECTION, SOLUTION INTRAMUSCULAR; INTRAVENOUS EVERY 6 HOURS
Status: DISCONTINUED | OUTPATIENT
Start: 2025-02-03 | End: 2025-02-03 | Stop reason: HOSPADM

## 2025-02-03 RX ORDER — OXYCODONE HYDROCHLORIDE 5 MG/1
10 TABLET ORAL PRN
Status: DISCONTINUED | OUTPATIENT
Start: 2025-02-03 | End: 2025-02-03 | Stop reason: HOSPADM

## 2025-02-03 RX ORDER — PROPOFOL 10 MG/ML
INJECTION, EMULSION INTRAVENOUS
Status: DISCONTINUED | OUTPATIENT
Start: 2025-02-03 | End: 2025-02-03 | Stop reason: SDUPTHER

## 2025-02-03 RX ORDER — BISACODYL 10 MG
10 SUPPOSITORY, RECTAL RECTAL DAILY PRN
Status: DISCONTINUED | OUTPATIENT
Start: 2025-02-03 | End: 2025-02-03 | Stop reason: HOSPADM

## 2025-02-03 RX ORDER — ONDANSETRON 2 MG/ML
4 INJECTION INTRAMUSCULAR; INTRAVENOUS EVERY 6 HOURS PRN
Status: DISCONTINUED | OUTPATIENT
Start: 2025-02-03 | End: 2025-02-03 | Stop reason: HOSPADM

## 2025-02-03 RX ORDER — NALOXONE HYDROCHLORIDE 0.4 MG/ML
INJECTION, SOLUTION INTRAMUSCULAR; INTRAVENOUS; SUBCUTANEOUS PRN
Status: DISCONTINUED | OUTPATIENT
Start: 2025-02-03 | End: 2025-02-03 | Stop reason: HOSPADM

## 2025-02-03 RX ORDER — PROCHLORPERAZINE EDISYLATE 5 MG/ML
5 INJECTION INTRAMUSCULAR; INTRAVENOUS
Status: DISCONTINUED | OUTPATIENT
Start: 2025-02-03 | End: 2025-02-03 | Stop reason: HOSPADM

## 2025-02-03 RX ORDER — OXYCODONE HYDROCHLORIDE 5 MG/1
5 TABLET ORAL
Status: DISCONTINUED | OUTPATIENT
Start: 2025-02-03 | End: 2025-02-03 | Stop reason: HOSPADM

## 2025-02-03 RX ADMIN — ACETAMINOPHEN 1000 MG: 500 TABLET, FILM COATED ORAL at 07:48

## 2025-02-03 RX ADMIN — CELECOXIB 200 MG: 200 CAPSULE ORAL at 07:48

## 2025-02-03 RX ADMIN — TRANEXAMIC ACID 1000 MG: 100 INJECTION INTRAVENOUS at 09:07

## 2025-02-03 RX ADMIN — SODIUM CHLORIDE: 9 INJECTION, SOLUTION INTRAVENOUS at 12:59

## 2025-02-03 RX ADMIN — MIDAZOLAM 2 MG: 1 INJECTION INTRAMUSCULAR; INTRAVENOUS at 09:00

## 2025-02-03 RX ADMIN — OXYCODONE 5 MG: 5 TABLET ORAL at 15:19

## 2025-02-03 RX ADMIN — MEPIVACAINE HYDROCHLORIDE 70 MG: 20 INJECTION, SOLUTION EPIDURAL; INFILTRATION at 09:12

## 2025-02-03 RX ADMIN — SODIUM CHLORIDE, SODIUM LACTATE, POTASSIUM CHLORIDE, AND CALCIUM CHLORIDE: 600; 310; 30; 20 INJECTION, SOLUTION INTRAVENOUS at 10:23

## 2025-02-03 RX ADMIN — PROPOFOL 50 MCG/KG/MIN: 10 INJECTION, EMULSION INTRAVENOUS at 09:20

## 2025-02-03 RX ADMIN — CEFAZOLIN 2000 MG: 2 INJECTION, POWDER, FOR SOLUTION INTRAMUSCULAR; INTRAVENOUS at 09:11

## 2025-02-03 RX ADMIN — GLYCOPYRROLATE 0.2 MG: 0.2 INJECTION INTRAMUSCULAR; INTRAVENOUS at 09:35

## 2025-02-03 RX ADMIN — TRANEXAMIC ACID 1000 MG: 100 INJECTION INTRAVENOUS at 10:34

## 2025-02-03 RX ADMIN — PHENYLEPHRINE HYDROCHLORIDE 100 MCG: 0.1 INJECTION, SOLUTION INTRAVENOUS at 09:43

## 2025-02-03 RX ADMIN — SODIUM CHLORIDE, SODIUM LACTATE, POTASSIUM CHLORIDE, AND CALCIUM CHLORIDE: 600; 310; 30; 20 INJECTION, SOLUTION INTRAVENOUS at 08:00

## 2025-02-03 RX ADMIN — PHENYLEPHRINE HYDROCHLORIDE 100 MCG: 0.1 INJECTION, SOLUTION INTRAVENOUS at 10:07

## 2025-02-03 RX ADMIN — ACETAMINOPHEN 1000 MG: 500 TABLET, FILM COATED ORAL at 12:50

## 2025-02-03 RX ADMIN — ONDANSETRON 4 MG: 2 INJECTION, SOLUTION INTRAMUSCULAR; INTRAVENOUS at 09:16

## 2025-02-03 RX ADMIN — KETOROLAC TROMETHAMINE 15 MG: 30 INJECTION, SOLUTION INTRAMUSCULAR; INTRAVENOUS at 10:48

## 2025-02-03 RX ADMIN — DEXAMETHASONE SODIUM PHOSPHATE 5 MG: 10 INJECTION INTRAMUSCULAR; INTRAVENOUS at 09:29

## 2025-02-03 ASSESSMENT — PAIN SCALES - GENERAL: PAINLEVEL_OUTOF10: 6

## 2025-02-03 ASSESSMENT — PAIN DESCRIPTION - DESCRIPTORS
DESCRIPTORS: ACHING;DISCOMFORT;GNAWING
DESCRIPTORS: ACHING

## 2025-02-03 ASSESSMENT — PAIN - FUNCTIONAL ASSESSMENT
PAIN_FUNCTIONAL_ASSESSMENT: 0-10
PAIN_FUNCTIONAL_ASSESSMENT: PREVENTS OR INTERFERES SOME ACTIVE ACTIVITIES AND ADLS

## 2025-02-03 ASSESSMENT — PAIN DESCRIPTION - LOCATION: LOCATION: HIP

## 2025-02-03 ASSESSMENT — PAIN DESCRIPTION - ORIENTATION: ORIENTATION: RIGHT

## 2025-02-03 NOTE — ANESTHESIA PROCEDURE NOTES
Spinal Block    Patient location during procedure: OR  End time: 2/3/2025 9:18 AM  Reason for block: primary anesthetic  Staffing  Performed: anesthesiologist   Anesthesiologist: Patrick Dc Jr., MD  Performed by: Patrick Dc Jr., MD  Authorized by: Patrick Dc Jr., MD    Spinal Block  Patient position: sitting  Prep: ChloraPrep  Patient monitoring: cardiac monitor, continuous pulse ox, frequent blood pressure checks and oxygen  Approach: midline  Location: L2/L3  Provider prep: mask and sterile gloves  Local infiltration: lidocaine  Needle  Needle type: Quincke   Needle gauge: 22 G  Needle length: 4 in  Needle insertion depth: 3 cm  Assessment  Sensory level: T6  Swirl obtained: Yes  CSF: clear  Attempts: 1  Hemodynamics: stable  Preanesthetic Checklist  Completed: patient identified, IV checked, site marked, risks and benefits discussed, surgical/procedural consents, equipment checked, pre-op evaluation, timeout performed, anesthesia consent given, oxygen available, monitors applied/VS acknowledged, fire risk safety assessment completed and verbalized and blood product R/B/A discussed and consented

## 2025-02-03 NOTE — ANESTHESIA PRE PROCEDURE
Department of Anesthesiology  Preprocedure Note       Name:  Bird Palencia   Age:  64 y.o.  :  1960                                          MRN:  [id number]         Date:  2/3/2025      Surgeon: Surgeon(s):  Dick Birch MD    Procedure: Procedure(s):  RIGHT HIP TOTAL ARTHROPLASTY ANTERIOR APPROACH-SDD    Medications prior to admission:   Prior to Admission medications    Medication Sig Start Date End Date Taking? Authorizing Provider   acetaminophen (TYLENOL) 325 MG tablet Take 3 tablets by mouth in the morning and 3 tablets at noon and 3 tablets in the evening. 25  Yes Dick Birch MD   b complex vitamins capsule Take 1 capsule by mouth daily   Yes ProviderKassandra MD   Multiple Vitamin (MULTIVITAMIN ADULT) TABS Take 1 tablet by mouth daily   Yes Kassandra Guy MD   glimepiride (AMARYL) 4 MG tablet Take 4 mg with food in the morning and 2 mg (1/2 tablet) at lunch for diabetes 24  Yes Kaye Sher APRN - CNP   rosuvastatin (CRESTOR) 10 MG tablet Take 1 tablet by mouth at bedtime For cholesterol 24  Yes Kaye Sher APRN - CNP   ezetimibe (ZETIA) 10 MG tablet Take 1 tablet by mouth daily For cholesterol 24  Yes Kaye Sher APRN - CNP   aspirin 81 MG EC tablet Take 1 tablet by mouth in the morning and 1 tablet in the evening.  Patient not taking: Reported on 2/3/2025 1/27/25   Dick Birch MD   celecoxib (CELEBREX) 200 MG capsule Take 1 capsule by mouth in the morning and at bedtime  Patient not taking: Reported on 2/3/2025 1/27/25   Dick Birch MD   oxyCODONE (ROXICODONE) 5 MG immediate release tablet Take 1-2 tablets by mouth every 4 hours as needed for Pain for up to 7 days. Max Daily Amount: 60 mg  Patient not taking: Reported on 2/3/2025 1/27/25 2/3/25  Dick Birch MD   traMADol (ULTRAM) 50 MG tablet Take 1 tablet by mouth every 6 hours as needed for Pain for up to 7 days. Intended supply: 5 days. Take

## 2025-02-03 NOTE — ANESTHESIA POSTPROCEDURE EVALUATION
Department of Anesthesiology  Postprocedure Note    Patient: Bird Palencia  MRN: [id number]  YOB: 1960  Date of evaluation: 2/3/2025    Procedure Summary       Date: 02/03/25 Room / Location: [facility] MAIN OR 03 / [facility] MAIN OR    Anesthesia Start: 0856 Anesthesia Stop: 1109    Procedure: RIGHT HIP TOTAL ARTHROPLASTY ANTERIOR APPROACH-SDD (Right: Hip) Diagnosis:       Primary osteoarthritis of right hip      (Primary osteoarthritis of right hip [M16.11])    Surgeons: Dick Birch MD Responsible Provider: Patrick Dc Jr., MD    Anesthesia Type: Spinal, TIVA ASA Status: 3            Anesthesia Type: Spinal, TIVA    Marlys Phase I: Marlys Score: 9    Marlys Phase II:      Anesthesia Post Evaluation    Patient location during evaluation: PACU  Patient participation: complete - patient participated  Level of consciousness: awake  Pain score: 0  Airway patency: patent  Nausea & Vomiting: no nausea and no vomiting  Cardiovascular status: blood pressure returned to baseline and hemodynamically stable  Respiratory status: acceptable, spontaneous ventilation and nonlabored ventilation  Hydration status: euvolemic  Multimodal analgesia pain management approach  Pain management: adequate    No notable events documented.

## 2025-02-03 NOTE — PROGRESS NOTES
OCCUPATIONAL THERAPY Initial Assessment and Daily Note      (Link to Caseload Tracking: OT Visit Days: 1  OT Orders   Time  OT Charge Capture  Rehab Caseload Tracker  Episode     Bird Palencia is a 64 y.o. male   PRIMARY DIAGNOSIS: Primary osteoarthritis of right hip  Primary osteoarthritis of right hip [M16.11]  Arthritis of right hip [M16.11]  Procedure(s) (LRB):  RIGHT HIP TOTAL ARTHROPLASTY ANTERIOR APPROACH-SDD (Right)  Day of Surgery  Reason for Referral: Pain in Right Hip (M25.551)  Stiffness of Right Hip, Not elsewhere classified (M25.651)  Outpatient in a bed: Payor: UMR / Plan: UMR / Product Type: *No Product type* /     ASSESSMENT:     REHAB RECOMMENDATIONS:   Recommendation to date pending progress:  Setting:  No further skilled occupational therapy after discharge from hospital    Equipment:    None     ASSESSMENT:  Mr. Palencia is s/p right YADI and presents with decreased independence with functional mobility and activities of daily living as compared to baseline level of function and safety. Patient would benefit from skilled Occupational Therapy to maximize independence and safety with self-care task and functional mobility.   Patient able to complete  lower body dressing and upper body dressing at edge of bed with minimal assist .  Mobilized from hospital bed to hallway using a rolling walker with assist. Patient educated on post op bathing strategies, self care compensatory strategies, and safety with functional transfers.  Patient is hopeful to return home day of surgery       [facility] AM-PAC™ “6 Clicks” Daily Activity Inpatient Short Form:     AM-PAC Daily Activity - Inpatient   How much help is needed for putting on and taking off regular lower body clothing?: A Little  How much help is needed for bathing (which includes washing, rinsing, drying)?: A Little  How much help is needed for toileting (which includes using toilet, bedpan, or urinal)?: A Little  How much help is 
at bedside    INTERDISCIPLINARY COLLABORATION:  RN/ PCT and OT/ PU[facility]    Compliance with Program/Exercises: compliant most of the time, Will assess as treatment progresses.    Recommendations/Intent for next treatment session:  Treatment next visit will focus on increasing Mr. Palencia's independence with bed mobility, transfers, gait training, strength/ROM exercises, modalities for pain, and patient education.     TIME IN/OUT:  Time In: 1415  Time Out: 1446  Minutes: 31    KAILEY CARTER, PT

## 2025-02-03 NOTE — OP NOTE
Operative Note      Patient: Bird Palencia  YOB: 1960  MRN: [id number]    Date of Procedure: 2/3/2025    Pre-Op Diagnosis Codes:      * Primary osteoarthritis of right hip [M16.11]    Post-Op Diagnosis: Same       Procedure(s):  RIGHT HIP TOTAL ARTHROPLASTY ANTERIOR APPROACH-SDD    Surgeon(s):  Dick Birch MD    Assistant:   First Assistant: Adela Murguia    Anesthesia: Spinal    Estimated Blood Loss (mL): 300     Complications: None    Specimens:   * No specimens in log *    Implants:  Implant Name Type Inv. Item Serial No.  Lot No. LRB No. Used Action   CUP ACET [id number] HIP GRIPTION CECI CEMENTLESS FIX SECT SER - [id number]  CUP ACET [id number] HIP GRIPTION CECI CEMENTLESS FIX SECT SER  [facility] [employer] [id number] Right 1 Implanted   SCREW BNE L25MM DIA6.5MM CANC HIP S STL GRIPTION FULL THRD - [id number]  SCREW BNE L25MM DIA6.5MM CANC HIP S STL GRIPTION FULL THRD  [facility] [employer] [id number] Right 1 Implanted   SCREW BNE L15MM DIA6.5MM CANC HIP S STL GRIPTION FULL THRD - [id number]  SCREW BNE L15MM DIA6.5MM CANC HIP S STL GRIPTION FULL THRD  [facility] [employer] [id number] Right 1 Implanted   LINER ALTRX NEUT [id number] - [id number]  LINER ALTRX NEUT [id number]  [facility] [employer] [id number] Right 1 Implanted   STEM FEM SZ 2 HI OFFSET CLLRD 12/14 TAPR ACTIS ART EZ - [id number]  STEM FEM SZ 2 HI OFFSET CLLRD 12/14 TAPR ACTIS ARTC EZ  [facility] [id number] Right 1 Implanted   HEAD FEM [id number] +5MM OFFSET 12/14 TAPR HIP CERAMIC TI SL - [id number]  HEAD FEM [id number] +5MM OFFSET 12/14 TAPR HIP CERAMIC TI SL  [facility] [employer] [facility] [id number] Right 1 Implanted         Drains: * No LDAs found *    Findings:  Infection Present At Time Of Surgery (PATOS) (choose all levels that have infection present):  No infection present  Other Findings: OA    Detailed Description of Procedure:        INDICATIONS:

## 2025-02-03 NOTE — DISCHARGE INSTRUCTIONS
[facility]      Patient Discharge Instructions    Bird Acuña Talha/[id number] : 1960    Admitted 2/3/2025 Discharged: 2/3/2025       IF YOU HAVE ANY PROBLEMS ONCE YOU ARE AT HOME CALL THE FOLLOWING NUMBERS:   Main office number: [phone]      Medications    The medications you are to continue on are listed on the medication reconciliation sheet.   Narcotic pain medications as well as supplemental iron can cause constipation. If this occurs try stopping the narcotic pain medication and/or the iron.   It is important that you take the medication exactly as they are prescribed.  Medications which increase your risk of blood clots are listed to stop for 5 weeks after surgery as well as medications or supplements which increase your risk of bleeding complications.   Keep your medication in the bottles provided by the pharmacist and keep a list of the medication names, dosages, and times to be taken in your wallet.   Do not take other medications without consulting your doctor.       Important Information    Do NOT smoke as this will greatly increase your risk of infection!    Resume your prehospital diet. If you have excessive nausea or vomitting call your doctor's office     Leg swelling and warmth is normal for 6 months after surgery. If you experience swelling in your leg elevate you leg while laying down with your toes above your heart. If you have sudden onset severe swelling with leg pain call our office. Use Polo Hose stockings until we see you in the office for your follow up appointment.    The stitches deep inside take approximately 6 months to dissolve. There will be sharp shooting, stinging and burning pain. This is normal and will resolve between 3-6 months after surgery.     Difficulty sleeping is normal following total Knee and Hip replacement. You may try melatonin, an over-the-counter sleep aid or benadryl to help with sleep. Most patients will resume sleeping through the

## 2025-02-03 NOTE — PERIOP NOTE
TRANSFER - OUT REPORT:    Verbal report given to Janie REINOSO on Bird Palencia  being transferred to [facility] for routine progression of patient care       Report consisted of patient's Situation, Background, Assessment and   Recommendations(SBAR).     Information from the following report(s) Nurse Handoff Report, Adult Overview, MAR, and Cardiac Rhythm NSR  was reviewed with the receiving nurse.           Lines:   Peripheral IV 02/03/25 Left;Posterior Hand (Active)   Site Assessment Clean, dry & intact 02/03/25 1109   Line Status Infusing 02/03/25 1109   Phlebitis Assessment No symptoms 02/03/25 1109   Infiltration Assessment 0 02/03/25 1109   Dressing Status Clean, dry & intact 02/03/25 1109   Dressing Type Transparent 02/03/25 1109        Opportunity for questions and clarification was provided.      Patient transported with:  O2 @ 0lpm        Topical Clindamycin Counseling: Patient counseled that this medication may cause skin irritation or allergic reactions.  In the event of skin irritation, the patient was advised to reduce the amount of the drug applied or use it less frequently.   The patient verbalized understanding of the proper use and possible adverse effects of clindamycin.  All of the patient's questions and concerns were addressed.

## 2025-02-03 NOTE — CARE COORDINATION
Patient is a 64 y.o. year old male admitted for Right YADI . Patient plans to return home on discharge. Order received to arrange home health. Patient without preference towards agency. Referral sent to [facility] by Jass. Patient denies any equipment needs as patient has a walker.  Will follow until discharge.      02/03/25 8543   Service Assessment   Patient Orientation Alert and Oriented   Cognition Alert   History Provided By Patient   Services At/After Discharge   Transition of Care Consult (CM Consult) Home Health   Internal Home Health Yes   Services At/After Discharge Home Health;PT   Mode of Transport at Discharge Self   Confirm Follow Up Transport Self   Condition of Participation: Discharge Planning   The Plan for Transition of Care is related to the following treatment goals: improve mobility   The Patient and/or Patient Representative was provided with a Choice of Provider? Patient   The Patient and/Or Patient Representative agree with the Discharge Plan? Yes   Freedom of Choice list was provided with basic dialogue that supports the patient's individualized plan of care/goals, treatment preferences, and shares the quality data associated with the providers?  Yes

## 2025-02-05 ENCOUNTER — TELEPHONE (OUTPATIENT)
Dept: ORTHOPEDIC SURGERY | Age: 65
End: 2025-02-05

## 2025-02-05 NOTE — TELEPHONE ENCOUNTER
Spoke with Jasbir, with [facility], who states that the wound vac is leaking and would like a nurse evaluation verbal order to have someone to come and look at him.  Verbal order given for nurse evaluation.  Patient verbalized understanding and voiced no other concerns at this time.

## 2025-02-06 ENCOUNTER — CLINICAL DOCUMENTATION (OUTPATIENT)
Dept: ORTHOPEDIC SURGERY | Age: 65
End: 2025-02-06

## 2025-02-18 ENCOUNTER — OFFICE VISIT (OUTPATIENT)
Dept: ORTHOPEDIC SURGERY | Age: 65
End: 2025-02-18

## 2025-02-18 DIAGNOSIS — Z96.641 S/P TOTAL RIGHT HIP ARTHROPLASTY: Primary | ICD-10-CM

## 2025-02-18 PROCEDURE — 99024 POSTOP FOLLOW-UP VISIT: CPT | Performed by: ORTHOPAEDIC SURGERY

## 2025-02-18 NOTE — PROGRESS NOTES
Patient ID:  Bird Palencia  [id number]  64 y.o.  1960    Today: February 18, 2025       CHIEF COMPLAINT:  Follow-up of right total hip replacement    HISTORY:  The patient presents today for 2-week follow-up after total hip replacement.  The patient continues to improve gradually.  Working with physical therapy on strengthening and stretching.  They are on aspirin for DVT prophylaxis.  Using assistive walking device appropriately.  Continues to take medication appropriately.      PE:  Incision is examined which is healing well.  No erythema, induration or drainage.  No significant fluid accumulation around the surgical site distally.   Fires ehl fhl ta gs p splt s/s/sp/dp.t, negative homans, leg lengths equal  Sensation intact.  Limb is perfused.  No sign of DVT.      ASSESSMENT:  64M  S/P Right Total Hip Replacement DOS 2/3/25    PLAN:  Continue activity and current weight bearing status.    Continue to take the aspirin 81 BID for a full month of DVT prophylaxis.  They are given a refill on their pain medication if they feel they are going to run out.   I have asked the patient not to submerge the incision under water or place any creams or oils over this for another week or two.  I will plan to check them back in 4 for follow-up or sooner if they have any issues, questions or concerns.       Signed By: Dick Birch MD  February 18, 2025

## 2025-02-20 ENCOUNTER — TELEPHONE (OUTPATIENT)
Dept: ORTHOPEDIC SURGERY | Age: 65
End: 2025-02-20

## 2025-02-20 NOTE — TELEPHONE ENCOUNTER
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

## 2025-02-20 NOTE — TELEPHONE ENCOUNTER
Spoke with patient who states that needs the work note to state 4/28/2025 to return back to work.  Patient states that he will  the note on tomorrow.   Patient aware that work note will be at the .  Patient verbalized understanding and voiced no other concerns a this time.

## 2025-02-26 ENCOUNTER — TELEPHONE (OUTPATIENT)
Dept: ORTHOPEDIC SURGERY | Age: 65
End: 2025-02-26

## 2025-02-26 NOTE — TELEPHONE ENCOUNTER
Spoke with patient and informed him that the letter will be in his chart.  Patient verbalized understanding and voiced no other concerns at this time

## 2025-02-26 NOTE — TELEPHONE ENCOUNTER
Patient left ms stating he needs a note stating he had his surgery on 2-3 to turn in to his insurance co, said he can  when ready.

## 2025-02-28 ENCOUNTER — TELEPHONE (OUTPATIENT)
Dept: ORTHOPEDIC SURGERY | Age: 65
End: 2025-02-28

## 2025-02-28 NOTE — TELEPHONE ENCOUNTER
No name provided but [employer] left msg stating they received the patients work note dated 2-26, they are asking to verify when patient was advised to stop working? Patient says he stopped working 1-24 but they are showing disability was authorized to start date of the surgery? # [phone]  Claim # [id number]. I don't show where we have previously completed any forms for [employer]?

## 2025-02-28 NOTE — TELEPHONE ENCOUNTER
Spoke with Brie who states that she will pass the information regarding patient being taken out of work on 1/24/2025 by his human resource department and we took him out of work on 2/3/25 date of surgery to Christin, the claim adjustor.  Brie verbalized understanding and voiced no other concerns at this time.

## 2025-03-19 ENCOUNTER — OFFICE VISIT (OUTPATIENT)
Dept: ORTHOPEDIC SURGERY | Age: 65
End: 2025-03-19

## 2025-03-19 DIAGNOSIS — Z96.641 S/P TOTAL RIGHT HIP ARTHROPLASTY: Primary | ICD-10-CM

## 2025-03-19 PROCEDURE — 99024 POSTOP FOLLOW-UP VISIT: CPT | Performed by: ORTHOPAEDIC SURGERY

## 2025-03-19 NOTE — PROGRESS NOTES
Name: Bird Palencia  YOB: 1960  Gender: male  MRN: [id number]    Left Post-Op YADI: 6 weeks      This patient returns now 6 weeks s/p YADI.  They are doing well.  There have been no significant issues since last visit.  Pain is controlled     PE: On exam today, incision is well healed without erythema ecchymosis or dehiscence.  The patient is ambulating with a steady gait with the use of SRRASSISTIVE DEVICE: No assistive device.  There is minimal discomfort with gentle range of motion of the operative hip.     RADIOGRAPHS:  AP pelvis and table lateral of the Left hip which demonstrate well fixed implants in good position. Without evidence of hardware failure.   No sign of fracture dislocation.    RADIOGRAPHIC IMPRESSION:  Stable Left YADI.    CLINICAL IMPRESSION AND PLAN:  Now six weeks s/p Left YADI.  I advised them to continue to wean from their current assistive device and to resume activities as tolerated.    Further activity was discussed with the patient as was further pain medications. He will  return at 1 year for repeat eval         Dick Birch MD

## 2025-05-10 DIAGNOSIS — E78.5 HYPERLIPIDEMIA LDL GOAL <70: ICD-10-CM

## 2025-05-12 RX ORDER — ROSUVASTATIN CALCIUM 10 MG/1
TABLET, COATED ORAL
Qty: 90 TABLET | Refills: 2 | Status: SHIPPED | OUTPATIENT
Start: 2025-05-12

## 2025-08-13 ENCOUNTER — LAB (OUTPATIENT)
Dept: FAMILY MEDICINE CLINIC | Facility: CLINIC | Age: 65
End: 2025-08-13

## 2025-08-13 DIAGNOSIS — E78.5 HYPERLIPIDEMIA LDL GOAL <70: ICD-10-CM

## 2025-08-13 DIAGNOSIS — E11.9 TYPE 2 DIABETES MELLITUS WITHOUT COMPLICATION, WITHOUT LONG-TERM CURRENT USE OF INSULIN (HCC): ICD-10-CM

## 2025-08-13 DIAGNOSIS — E55.9 VITAMIN D DEFICIENCY, UNSPECIFIED: ICD-10-CM

## 2025-08-13 LAB
25(OH)D3 SERPL-MCNC: 39.5 NG/ML (ref 30–100)
ALBUMIN SERPL-MCNC: 3.9 G/DL (ref 3.2–4.6)
ALBUMIN/GLOB SERPL: 1.2 (ref 1–1.9)
ALP SERPL-CCNC: 112 U/L (ref 40–129)
ALT SERPL-CCNC: 78 U/L (ref 8–55)
ANION GAP SERPL CALC-SCNC: 12 MMOL/L (ref 7–16)
AST SERPL-CCNC: 45 U/L (ref 15–37)
BASOPHILS # BLD: 0.02 K/UL (ref 0–0.2)
BASOPHILS NFR BLD: 0.3 % (ref 0–2)
BILIRUB SERPL-MCNC: 0.6 MG/DL (ref 0–1.2)
BUN SERPL-MCNC: 14 MG/DL (ref 8–23)
CALCIUM SERPL-MCNC: 9.4 MG/DL (ref 8.8–10.2)
CHLORIDE SERPL-SCNC: 99 MMOL/L (ref 98–107)
CHOLEST SERPL-MCNC: 114 MG/DL (ref 0–200)
CO2 SERPL-SCNC: 25 MMOL/L (ref 20–29)
CREAT SERPL-MCNC: 0.96 MG/DL (ref 0.8–1.3)
CREAT UR-MCNC: 112 MG/DL (ref 39–259)
DIFFERENTIAL METHOD BLD: ABNORMAL
EOSINOPHIL # BLD: 0.11 K/UL (ref 0–0.8)
EOSINOPHIL NFR BLD: 1.5 % (ref 0.5–7.8)
ERYTHROCYTE [DISTWIDTH] IN BLOOD BY AUTOMATED COUNT: 15.1 % (ref 11.9–14.6)
EST. AVERAGE GLUCOSE BLD GHB EST-MCNC: 178 MG/DL
GLOBULIN SER CALC-MCNC: 3.2 G/DL (ref 2.3–3.5)
GLUCOSE SERPL-MCNC: 150 MG/DL (ref 70–99)
HBA1C MFR BLD: 7.8 % (ref 0–5.6)
HCT VFR BLD AUTO: 51 % (ref 41.1–50.3)
HDLC SERPL-MCNC: 28 MG/DL (ref 40–60)
HDLC SERPL: 4 (ref 0–5)
HGB BLD-MCNC: 16.1 G/DL (ref 13.6–17.2)
IMM GRANULOCYTES # BLD AUTO: 0.02 K/UL (ref 0–0.5)
IMM GRANULOCYTES NFR BLD AUTO: 0.3 % (ref 0–5)
LDLC SERPL CALC-MCNC: 63 MG/DL (ref 0–100)
LYMPHOCYTES # BLD: 3.11 K/UL (ref 0.5–4.6)
LYMPHOCYTES NFR BLD: 43.3 % (ref 13–44)
MCH RBC QN AUTO: 28.5 PG (ref 26.1–32.9)
MCHC RBC AUTO-ENTMCNC: 31.6 G/DL (ref 31.4–35)
MCV RBC AUTO: 90.3 FL (ref 82–102)
MICROALBUMIN UR-MCNC: 1.25 MG/DL (ref 0–20)
MICROALBUMIN/CREAT UR-RTO: 11 MG/G (ref 0–30)
MONOCYTES # BLD: 0.71 K/UL (ref 0.1–1.3)
MONOCYTES NFR BLD: 9.9 % (ref 4–12)
NEUTS SEG # BLD: 3.22 K/UL (ref 1.7–8.2)
NEUTS SEG NFR BLD: 44.7 % (ref 43–78)
NRBC # BLD: 0 K/UL (ref 0–0.2)
PLATELET # BLD AUTO: 189 K/UL (ref 150–450)
PMV BLD AUTO: 10.6 FL (ref 9.4–12.3)
POTASSIUM SERPL-SCNC: 4.1 MMOL/L (ref 3.5–5.1)
PROT SERPL-MCNC: 7.1 G/DL (ref 6.3–8.2)
RBC # BLD AUTO: 5.65 M/UL (ref 4.23–5.6)
SODIUM SERPL-SCNC: 136 MMOL/L (ref 136–145)
TRIGL SERPL-MCNC: 115 MG/DL (ref 0–150)
TSH W FREE THYROID IF ABNORMAL: 1.74 UIU/ML (ref 0.27–4.2)
VLDLC SERPL CALC-MCNC: 23 MG/DL (ref 6–23)
WBC # BLD AUTO: 7.2 K/UL (ref 4.3–11.1)

## (undated) DEVICE — APPLICATOR MEDICATED 26 CC SOLUTION HI LT ORNG CHLORAPREP

## (undated) DEVICE — YANKAUER,FLEXIBLE HANDLE,REGLR CAPACITY: Brand: MEDLINE INDUSTRIES, INC.

## (undated) DEVICE — SLING ARM LG 2-37INX9-20IN PCH --

## (undated) DEVICE — SUT ETHLN 3-0 18IN PS1 BLK --

## (undated) DEVICE — SOLUTION IRRIG 3000ML 0.9% SOD CHL FLX CONT 0797208] ICU MEDICAL INC]

## (undated) DEVICE — SUTURE ETHIBOND EXCEL SZ 5 L30IN NONABSORBABLE GRN L40MM V-37 MB66G

## (undated) DEVICE — DRAPE,SHOULDER,BEACH CHAIR,STERILE: Brand: MEDLINE

## (undated) DEVICE — NDL PRT INJ NSAF BLNT 18GX1.5 --

## (undated) DEVICE — 90-S ACCELERATOR, SUCTION PROBE, NON-BENDABLE, MAX CUT LEVEL 11: Brand: SERFAS ENERGY

## (undated) DEVICE — PATIENT CARE KIT ADJ ARM BRD PERINL POST CVR BLU FOAM

## (undated) DEVICE — MARKER SURG SKIN UTIL BLK REG TIP NONSMEARING W/ 6IN RUL

## (undated) DEVICE — BLADE SHV DIA4MM RED RESECT FOR GEN DEB REM OF PERIOST FR

## (undated) DEVICE — DRAPE C ARM W/ POLY STRP W42XL72IN FOR MOB XR

## (undated) DEVICE — SOLUTION IRRIG 3000ML 0.9% SOD CHL USP UROMATIC PLAS CONT

## (undated) DEVICE — SYR 3ML LL TIP 1/10ML GRAD --

## (undated) DEVICE — GRIPPER SURGICAL RETRACTOR DISP

## (undated) DEVICE — 3M™ COBAN™ SELF-ADHERENT WRAP, 1586S, STERILE, 6 IN X 5 YD (15 CM X 4,5 M), 12 ROLLS/CASE: Brand: 3M™ COBAN™

## (undated) DEVICE — 3M™ STERI-DRAPE™ U-DRAPE 1015: Brand: STERI-DRAPE™

## (undated) DEVICE — 2.3MM ICONIX DISPOSABLE DRILL: Brand: ICONIX

## (undated) DEVICE — STERILE SYNTHETIC POLYISOPRENE POWDER-FREE SURGICAL GLOVES WITH HYDROGEL COATING, SMOOTH FINISH, STRAIGHT FINGER: Brand: PROTEXIS

## (undated) DEVICE — [AUGER BUR, ARTHROSCOPIC SHAVER BLADE,  DO NOT RESTERILIZE,  DO NOT USE IF PACKAGE IS DAMAGED,  KEEP DRY,  KEEP AWAY FROM SUNLIGHT]: Brand: FORMULA

## (undated) DEVICE — SURGICAL PROCEDURE PACK BASIC ST FRANCIS

## (undated) DEVICE — INFLOW CASSETTE TUBING, DO NOT USE IF PACKAGE IS DAMAGED: Brand: CROSSFLOW

## (undated) DEVICE — DRAPE SURG 1 MIN SET TBL ESYSUIT

## (undated) DEVICE — BANDAGE COBAN 6 IN WND 6INX5YD FOAM

## (undated) DEVICE — CONNECTOR TBNG OD5-7MM O2 END DISP

## (undated) DEVICE — BIT DRL L25MM DIA3.8MM FLX QUICKSET

## (undated) DEVICE — ABDOMINAL PAD: Brand: DERMACEA

## (undated) DEVICE — HOOD: Brand: T7PLUS

## (undated) DEVICE — KENDALL RADIOLUCENT FOAM MONITORING ELECTRODE RECTANGULAR SHAPE: Brand: KENDALL

## (undated) DEVICE — SUIT SURG ISOLATN ZIP TOGA XL T7 +

## (undated) DEVICE — SUTURE STRATAFIX SYMMETRIC SZ 1 L18IN ABSRB VLT CT1 L36CM SXPP1A404

## (undated) DEVICE — TOTAL HIP PACK: Brand: MEDLINE INDUSTRIES, INC.

## (undated) DEVICE — NDL SPNE QNCKE 18GX3.5IN LF --

## (undated) DEVICE — SYR 5ML 1/5 GRAD LL NSAF LF --

## (undated) DEVICE — AMD ANTIMICROBIAL GAUZE SPONGES,12 PLY USP TYPE VII, 0.2% POLYHEXAMETHYLENE BIGUANIDE HCI (PHMB): Brand: CURITY

## (undated) DEVICE — ILLUMINATOR SURG YANKAUER MTL TIP STRL PHOTONSABER Y DISP

## (undated) DEVICE — PREVENA INCISION MANAGEMENT SYSTEM- PEEL & PLACE DRESSING: Brand: PREVENA™ PEEL & PLACE™

## (undated) DEVICE — SUTURE VICRYL + SZ 1 L18IN ABSRB UD L36MM CT-1 1/2 CIR VCP841D

## (undated) DEVICE — STOCKINETTE ORTH W9XL36IN COT 2 PLY HLLW FOR HANDLING LMB

## (undated) DEVICE — (D)PREP SKN CHLRAPRP APPL 26ML -- CONVERT TO ITEM 371833

## (undated) DEVICE — CANNULA NSL ORAL AD FOR CAPNOFLEX CO2 O2 AIRLFE

## (undated) DEVICE — SUTURE MONOCRYL SZ 3-0 L27IN ABSRB UD L24MM PS-1 3/8 CIR PRIM Y936H

## (undated) DEVICE — DRAPE,U/SHT,SPLIT,FILM,60X84,STERILE: Brand: MEDLINE

## (undated) DEVICE — DRESSING HYDROFIBER AQUACEL AG ADVANTAGE 3.5X6 IN

## (undated) DEVICE — SUTURE VICRYL SZ 2-0 L36IN ABSRB UD L36MM CT-1 1/2 CIR J945H

## (undated) DEVICE — RESTRAINT UNIVERSAL HEAD DISP --

## (undated) DEVICE — GLOVE SURG SZ 8 L12IN FNGR THK79MIL GRN LTX FREE

## (undated) DEVICE — SUTURE VICRYL + SZ 1 L36IN ABSRB UD L36MM CT-1 1/2 CIR VCP947H

## (undated) DEVICE — STERILE PVP: Brand: MEDLINE INDUSTRIES, INC.

## (undated) DEVICE — OUTFLOW CASSETTE TUBING, DO NOT USE IF PACKAGE IS DAMAGED: Brand: CROSSFLOW

## (undated) DEVICE — BIPOLAR SEALER 23-112-1 AQM 6.0: Brand: AQUAMANTYS ®